# Patient Record
Sex: FEMALE | Race: WHITE | NOT HISPANIC OR LATINO | Employment: FULL TIME | URBAN - METROPOLITAN AREA
[De-identification: names, ages, dates, MRNs, and addresses within clinical notes are randomized per-mention and may not be internally consistent; named-entity substitution may affect disease eponyms.]

---

## 2017-01-06 ENCOUNTER — LAB VISIT (OUTPATIENT)
Dept: LAB | Facility: HOSPITAL | Age: 52
End: 2017-01-06
Attending: FAMILY MEDICINE
Payer: COMMERCIAL

## 2017-01-06 ENCOUNTER — OFFICE VISIT (OUTPATIENT)
Dept: FAMILY MEDICINE | Facility: CLINIC | Age: 52
End: 2017-01-06
Payer: COMMERCIAL

## 2017-01-06 VITALS
HEART RATE: 55 BPM | HEIGHT: 61 IN | WEIGHT: 163.13 LBS | DIASTOLIC BLOOD PRESSURE: 82 MMHG | TEMPERATURE: 98 F | BODY MASS INDEX: 30.8 KG/M2 | SYSTOLIC BLOOD PRESSURE: 120 MMHG | OXYGEN SATURATION: 98 %

## 2017-01-06 DIAGNOSIS — R63.5 ABNORMAL WEIGHT GAIN: ICD-10-CM

## 2017-01-06 DIAGNOSIS — Z12.31 VISIT FOR SCREENING MAMMOGRAM: ICD-10-CM

## 2017-01-06 DIAGNOSIS — Z11.59 NEED FOR HEPATITIS C SCREENING TEST: ICD-10-CM

## 2017-01-06 DIAGNOSIS — Z00.00 ANNUAL PHYSICAL EXAM: ICD-10-CM

## 2017-01-06 DIAGNOSIS — Z13.1 SCREENING FOR DIABETES MELLITUS: ICD-10-CM

## 2017-01-06 DIAGNOSIS — Z00.00 ANNUAL PHYSICAL EXAM: Primary | ICD-10-CM

## 2017-01-06 DIAGNOSIS — R79.89 INCREASED PTH LEVEL: ICD-10-CM

## 2017-01-06 LAB
ALBUMIN SERPL BCP-MCNC: 4.2 G/DL
ALP SERPL-CCNC: 64 U/L
ALT SERPL W/O P-5'-P-CCNC: 17 U/L
ANION GAP SERPL CALC-SCNC: 10 MMOL/L
AST SERPL-CCNC: 28 U/L
BASOPHILS # BLD AUTO: 0.04 K/UL
BASOPHILS NFR BLD: 0.5 %
BILIRUB SERPL-MCNC: 0.3 MG/DL
BILIRUB UR QL STRIP: NEGATIVE
BUN SERPL-MCNC: 19 MG/DL
CA-I BLDV-SCNC: 1.43 MMOL/L
CALCIUM SERPL-MCNC: 10.2 MG/DL
CHLORIDE SERPL-SCNC: 103 MMOL/L
CLARITY UR REFRACT.AUTO: CLEAR
CO2 SERPL-SCNC: 23 MMOL/L
COLOR UR AUTO: YELLOW
CREAT SERPL-MCNC: 0.9 MG/DL
DIFFERENTIAL METHOD: NORMAL
EOSINOPHIL # BLD AUTO: 0.4 K/UL
EOSINOPHIL NFR BLD: 4.7 %
ERYTHROCYTE [DISTWIDTH] IN BLOOD BY AUTOMATED COUNT: 13 %
EST. GFR  (AFRICAN AMERICAN): >60 ML/MIN/1.73 M^2
EST. GFR  (NON AFRICAN AMERICAN): >60 ML/MIN/1.73 M^2
GLUCOSE SERPL-MCNC: 88 MG/DL
GLUCOSE UR QL STRIP: NEGATIVE
HCT VFR BLD AUTO: 39.8 %
HGB BLD-MCNC: 13.7 G/DL
HGB UR QL STRIP: NEGATIVE
KETONES UR QL STRIP: ABNORMAL
LEUKOCYTE ESTERASE UR QL STRIP: NEGATIVE
LYMPHOCYTES # BLD AUTO: 2.3 K/UL
LYMPHOCYTES NFR BLD: 26.7 %
MCH RBC QN AUTO: 30.2 PG
MCHC RBC AUTO-ENTMCNC: 34.4 %
MCV RBC AUTO: 88 FL
MONOCYTES # BLD AUTO: 0.7 K/UL
MONOCYTES NFR BLD: 7.5 %
NEUTROPHILS # BLD AUTO: 5.3 K/UL
NEUTROPHILS NFR BLD: 60.4 %
NITRITE UR QL STRIP: NEGATIVE
PH UR STRIP: 6 [PH] (ref 5–8)
PLATELET # BLD AUTO: 348 K/UL
PMV BLD AUTO: 10 FL
POTASSIUM SERPL-SCNC: 4.2 MMOL/L
PROT SERPL-MCNC: 7.6 G/DL
PROT UR QL STRIP: NEGATIVE
PTH-INTACT SERPL-MCNC: 135 PG/ML
RBC # BLD AUTO: 4.53 M/UL
SODIUM SERPL-SCNC: 136 MMOL/L
SP GR UR STRIP: 1.01 (ref 1–1.03)
TSH SERPL DL<=0.005 MIU/L-ACNC: 1.51 UIU/ML
URN SPEC COLLECT METH UR: ABNORMAL
UROBILINOGEN UR STRIP-ACNC: NEGATIVE EU/DL
WBC # BLD AUTO: 8.77 K/UL

## 2017-01-06 PROCEDURE — 86803 HEPATITIS C AB TEST: CPT

## 2017-01-06 PROCEDURE — 99396 PREV VISIT EST AGE 40-64: CPT | Mod: S$GLB,,, | Performed by: FAMILY MEDICINE

## 2017-01-06 PROCEDURE — 99999 PR PBB SHADOW E&M-EST. PATIENT-LVL III: CPT | Mod: PBBFAC,,, | Performed by: FAMILY MEDICINE

## 2017-01-06 PROCEDURE — 85025 COMPLETE CBC W/AUTO DIFF WBC: CPT

## 2017-01-06 PROCEDURE — 83970 ASSAY OF PARATHORMONE: CPT

## 2017-01-06 PROCEDURE — 80053 COMPREHEN METABOLIC PANEL: CPT

## 2017-01-06 PROCEDURE — 36415 COLL VENOUS BLD VENIPUNCTURE: CPT | Mod: PO

## 2017-01-06 PROCEDURE — 84443 ASSAY THYROID STIM HORMONE: CPT

## 2017-01-06 PROCEDURE — 82330 ASSAY OF CALCIUM: CPT

## 2017-01-06 PROCEDURE — 83036 HEMOGLOBIN GLYCOSYLATED A1C: CPT

## 2017-01-06 PROCEDURE — 81003 URINALYSIS AUTO W/O SCOPE: CPT

## 2017-01-06 NOTE — PATIENT INSTRUCTIONS
Prevention Guidelines, Women Ages 50 to 64  Screening tests and vaccines are an important part of managing your health. Health counseling is essential, too. Below are guidelines for these, for women ages 50 to 64. Talk with your healthcare provider to make sure youre up to date on what you need.  Screening Who needs it How often   Type 2 diabetes or prediabetes All adults beginning at age 45 and adults without symptoms at any age who are overweight or obese and have 1 or more additional risk factors for diabetes. At  least every 3 years   Alcohol misuse All women in this age group At routine exams   Blood pressure All women in this age group Every 2 years if your blood pressure is less than 120/80 mm Hg; yearly if your systolic blood pressure is 120 to 139 mm Hg, or your diastolic blood pressure reading is 80 to 89 mm Hg   Breast cancer All women in this age group Yearly mammogram and clinical breast exam1   Cervical cancer All women in this age group, except women who have had a complete hysterectomy Pap test every 3 years or Pap test with human papillomavirus (HPV) test every 5 years   Chlamydia Women at increased risk for infection At routine exams   Colorectal cancer All women in this age group Flexible sigmoidoscopy every 5 years, or colonoscopy every 10 years, or double-contrast barium enema every 5 years; yearly fecal occult blood test or fecal immunochemical test; or a stool DNA test as often as your health care provider advises; talk with your health care provider about which tests are best for you   Depression All women in this age group At routine exams   Gonorrhea Sexually active women at increased risk for infection At routine exams   Hepatitis C Anyone at increased risk; 1 time for those born between 1945 and 1965 At routine exams   High cholesterol or triglycerides All women in this age group who are at risk for coronary artery disease At least every 5 years   HIV All women At routine exams   Lung  cancer Adults age 55 to 80 who have smoked Yearly screening in smokers with 30 pack-year history of smoking or who quit within 15 years   Obesity All women in this age group At routine exams   Osteoporosis Women who are postmenopausal Ask your healthcare provider   Syphilis Women at increased risk for infection - talk with your healthcare provider At routine exams   Tuberculosis Women at increased risk for infection - talk with your healthcare provider Ask your healthcare provider   Vision All women in this age group Ask your healthcare provider   Vaccine Who needs it How often   Chickenpox (varicella) All women in this age group who have no record of this infection or vaccine 2 doses; the second dose should be given at least 4 weeks after the first dose   Hepatitis A Women at increased risk for infection - talk with your healthcare provider 2 doses given at least 6 months apart   Hepatitis B Women at increased risk for infection - talk with your healthcare provider 3 doses over 6 months; second dose should be given 1 month after the first dose; the third dose should be given at least 2 months after the second dose and at least 4 months after the first dose   Haemophilus influenzaeType B (HIB) Women at increased risk for infection - talk with your healthcare provider 1 to 3 doses   Influenza (flu) All women in this age group Once a year   Measles, mumps, rubella (MMR) Women in this age group through their late 50s who have no record of these infections or vaccines 1 dose   Meningococcal Women at increased risk for infection - talk with your healthcare provider 1 or more doses   Pneumococcal conjugate vaccine (PCV13) and pneumococcal polysaccharide vaccine (PPSV23) Women at increased risk for infection - talk with your healthcare provider PCV13: 1 dose ages 19 to 65 (protects against 13 types of pneumococcal bacteria)  PPSV23: 1 to 2 doses through age 64, or 1 dose at 65 or older (protects against 23 types of  pneumococcal bacteria)   Tetanus/diphtheria/pertussis (Td/Tdap) booster All women in this age group Td every 10 years, or a one-time dose of Tdap instead of a Td booster after age 18, then Td every 10 years   Zoster All women ages 60 and older 1 dose   Counseling Who needs it How often   BRCA gene mutation testing for breast and ovarian cancer susceptibility Women with increased risk for having gene mutation When your risk is known   Breast cancer and chemoprevention Women at high risk for breast cancer When your risk is known   Diet and exercise Women who are overweight or obese When diagnosed, and then at routine exams   Sexually transmitted infection prevention Women at increased risk for infection - talk with your healthcare provider At routine exams   Use of daily aspirin Women ages 55 and up in this age group who are at risk for cardiovascular health problems such as stroke When your risk is known   Use of tobacco and the health effects it can cause All women in this age group Every exam   1American Cancer Society  © 1890-9550 [employer]. [address] [place] [address], [place] [address]. All rights reserved. This information is not intended as a substitute for professional medical care. Always follow your healthcare professional's instructions.

## 2017-01-06 NOTE — MR AVS SNAPSHOT
[facility]  [address]  [facility] [address]  Phone: [phone]  Fax: [phone]                  Kylah Ruiz   2017 11:20 AM   Office Visit    Description:  Female : 1965   Provider:  Meg Lemus MD   Department:  [facility]           Reason for Visit     Annual Exam           Diagnoses this Visit        Comments    Annual physical exam    -  Primary     Increased PTH level         Abnormal weight gain         Screening for diabetes mellitus         Need for hepatitis C screening test         Visit for screening mammogram                To Do List           Future Appointments        Provider Department Dept Phone    2017 8:00 AM [facility]ATORY, [facility]     1/10/2017 9:15 AM Xin Jones MD [facility]/ [phone]    1/10/2017 12:40 PM [facility] MAMMO1 [facility] [phone]      Goals (5 Years of Data)     None      [facility]     [facility] Nurse Care Line -  Assistance  Registered nurses in the [facility] provide clinical advisement, health education, appointment booking, and other advisory services.  Call for this free service at [phone].             Medications           Message regarding Medications     Verify the changes and/or additions to your medication regime listed below are the same as discussed with your clinician today.  If any of these changes or additions are incorrect, please notify your healthcare provider.             Verify that the below list of medications is an accurate representation of the medications you are currently taking.  If none reported, the list may be blank. If incorrect, please contact your healthcare provider. Carry this list with you in case of emergency.           Current Medications     cyclobenzaprine (FLEXERIL) 10 MG tablet     diazepam (VALIUM) 5 MG tablet     ondansetron (ZOFRAN-ODT) 4 MG TbDL   "   oxycodone-acetaminophen (PERCOCET) 7.5-325 mg per tablet            Clinical Reference Information           Vital Signs - Last Recorded  Most recent update: 1/6/2017 12:11 PM by Zaria Centeno LPN    BP Pulse Temp Ht Wt LMP    120/82 (!) 55 98.1 °F (36.7 °C) (Oral) 5' 1" (1.549 m) 74 kg (163 lb 2.3 oz) 01/05/2017    SpO2 BMI             98% 30.83 kg/m2         Blood Pressure          Most Recent Value    BP  120/82      Allergies as of 1/6/2017     Azithromycin      Immunizations Administered on Date of Encounter - 1/6/2017     None      Orders Placed During Today's Visit      Normal Orders This Visit    Urinalysis     Future Labs/Procedures Expected by Expires    Calcium, ionized  1/6/2017 3/7/2018    CBC auto differential  1/6/2017 3/7/2018    Comprehensive metabolic panel  1/6/2017 3/7/2018    Hemoglobin A1c  1/6/2017 3/7/2018    Hepatitis C antibody  1/6/2017 3/7/2018    Lipid panel  1/6/2017 3/7/2018    Mammo Digital Screening Bilat with CAD  1/6/2017 3/8/2018    PTH, intact  1/6/2017 3/7/2018    TSH  1/6/2017 3/7/2018      Instructions      Prevention Guidelines, Women Ages 50 to 64  Screening tests and vaccines are an important part of managing your health. Health counseling is essential, too. Below are guidelines for these, for women ages 50 to 64. Talk with your healthcare provider to make sure youre up to date on what you need.  Screening Who needs it How often   Type 2 diabetes or prediabetes All adults beginning at age 45 and adults without symptoms at any age who are overweight or obese and have 1 or more additional risk factors for diabetes. At  least every 3 years   Alcohol misuse All women in this age group At routine exams   Blood pressure All women in this age group Every 2 years if your blood pressure is less than 120/80 mm Hg; yearly if your systolic blood pressure is 120 to 139 mm Hg, or your diastolic blood pressure reading is 80 to 89 mm Hg   Breast cancer All women in this age group " Yearly mammogram and clinical breast exam1   Cervical cancer All women in this age group, except women who have had a complete hysterectomy Pap test every 3 years or Pap test with human papillomavirus (HPV) test every 5 years   Chlamydia Women at increased risk for infection At routine exams   Colorectal cancer All women in this age group Flexible sigmoidoscopy every 5 years, or colonoscopy every 10 years, or double-contrast barium enema every 5 years; yearly fecal occult blood test or fecal immunochemical test; or a stool DNA test as often as your health care provider advises; talk with your health care provider about which tests are best for you   Depression All women in this age group At routine exams   Gonorrhea Sexually active women at increased risk for infection At routine exams   Hepatitis C Anyone at increased risk; 1 time for those born between 1945 and 1965 At routine exams   High cholesterol or triglycerides All women in this age group who are at risk for coronary artery disease At least every 5 years   HIV All women At routine exams   Lung cancer Adults age 55 to 80 who have smoked Yearly screening in smokers with 30 pack-year history of smoking or who quit within 15 years   Obesity All women in this age group At routine exams   Osteoporosis Women who are postmenopausal Ask your healthcare provider   Syphilis Women at increased risk for infection - talk with your healthcare provider At routine exams   Tuberculosis Women at increased risk for infection - talk with your healthcare provider Ask your healthcare provider   Vision All women in this age group Ask your healthcare provider   Vaccine Who needs it How often   Chickenpox (varicella) All women in this age group who have no record of this infection or vaccine 2 doses; the second dose should be given at least 4 weeks after the first dose   Hepatitis A Women at increased risk for infection - talk with your healthcare provider 2 doses given at least 6  months apart   Hepatitis B Women at increased risk for infection - talk with your healthcare provider 3 doses over 6 months; second dose should be given 1 month after the first dose; the third dose should be given at least 2 months after the second dose and at least 4 months after the first dose   Haemophilus influenzaeType B (HIB) Women at increased risk for infection - talk with your healthcare provider 1 to 3 doses   Influenza (flu) All women in this age group Once a year   Measles, mumps, rubella (MMR) Women in this age group through their late 50s who have no record of these infections or vaccines 1 dose   Meningococcal Women at increased risk for infection - talk with your healthcare provider 1 or more doses   Pneumococcal conjugate vaccine (PCV13) and pneumococcal polysaccharide vaccine (PPSV23) Women at increased risk for infection - talk with your healthcare provider PCV13: 1 dose ages 19 to 65 (protects against 13 types of pneumococcal bacteria)  PPSV23: 1 to 2 doses through age 64, or 1 dose at 65 or older (protects against 23 types of pneumococcal bacteria)   Tetanus/diphtheria/pertussis (Td/Tdap) booster All women in this age group Td every 10 years, or a one-time dose of Tdap instead of a Td booster after age 18, then Td every 10 years   Zoster All women ages 60 and older 1 dose   Counseling Who needs it How often   BRCA gene mutation testing for breast and ovarian cancer susceptibility Women with increased risk for having gene mutation When your risk is known   Breast cancer and chemoprevention Women at high risk for breast cancer When your risk is known   Diet and exercise Women who are overweight or obese When diagnosed, and then at routine exams   Sexually transmitted infection prevention Women at increased risk for infection - talk with your healthcare provider At routine exams   Use of daily aspirin Women ages 55 and up in this age group who are at risk for cardiovascular health problems such as  stroke When your risk is known   Use of tobacco and the health effects it can cause All women in this age group Every exam   1American Cancer Society  © 7223-5592 [employer]. [address] [place] [address], [place] [address]. All rights reserved. This information is not intended as a substitute for professional medical care. Always follow your healthcare professional's instructions.

## 2017-01-07 NOTE — PROGRESS NOTES
Kylah Ruiz 51 y.o. White female      HPI- The patient is presenting today for Annual Exam  50yo female presents today for wellness examination. No hearing or vision concerns are reported. Diet is described as well balanced and healthy. She exercises regularly including weight lifting and cardio for 20 minutes at least three times weekly. Sleep and elimination patterns are described within normal limits. No report of depression or anxiety. She is concerned about significant weight gain within the past year in spite of healthy lifestyle. She is requesting further lab assessment to include screening for underlying thyroid disease. There is no prior history of thyroid dysfunction. Patient has gastric sleeve placement remotely. Updated health maintenance is needed. Immunizations are not up to date. There have been no recent ER visits or hospitalizations.    History reviewed. No pertinent past medical history.  Past Surgical History   Procedure Laterality Date    Gastric sleeve       section      Femur fracture with repair      Cosmetic surgery       tummy tuck breast enlargement     Family History   Problem Relation Age of Onset    Heart disease Father      Current Outpatient Prescriptions   Medication Sig Dispense Refill    cyclobenzaprine (FLEXERIL) 10 MG tablet       diazepam (VALIUM) 5 MG tablet       ondansetron (ZOFRAN-ODT) 4 MG TbDL       oxycodone-acetaminophen (PERCOCET) 7.5-325 mg per tablet        No current facility-administered medications for this visit.      Allergies-  Review of patient's allergies indicates:   Allergen Reactions    Azithromycin      ROS-   CONSTITUTIONAL- No fever, chills, fatigue or weight loss, +weight gain   HEENT- No vision changes, ear pain, hearing loss  CHEST- No cough, shortness of breath  CV- No chest pain, palpitations, edema  Abdomen- No abdominal pain, change in bowel habits, blood in stool  - No change in urination, no dysuria, no  "hematuria  Neurologic- No headaches, speech changes, vertigo, gait changes  Musculoskeletal- No joint pain, no back pain, no myalgias  Endocrine- No polydypsia, polyphagia or polyuria, no heat or cold intolerance  Hematologic- No bruising or bleeding, no lymphadenopathy  Psych- No change in mood, no concentration issues, no trouble with sleep  Integument- No rashes, no skin changes    Visit Vitals    /82    Pulse (!) 55    Temp 98.1 °F (36.7 °C) (Oral)    Ht 5' 1" (1.549 m)    Wt 74 kg (163 lb 2.3 oz)    LMP 01/05/2017    SpO2 98%    BMI 30.83 kg/m2     PE-  CONSTITIONAL- Well developed, well nourished, no acute distress  HEENT- Normal cephalic, atraumatic, PERRLA, right ear external- no abnormality, left ear external- no abnormality, right TM- normal to visualization, left TM- normal to visualization, moist mucus membranes, no oropharyngeal abnormality visualized nasal turbinates are clear  Neck- Full range of motion, no thyromegaly, no cervical lymphadenopathy  CV- Normal rate and rhythm, heart sounds normal, no murmurs, rubs or gallops  Chest- Breath sounds are normal and equal bilaterally, normal inspiratory and expiratory efforts  Abdomen- Bowel sounds are equal in all four quadrants, non tender to palpation, soft, no distention  Musculoskeletal- Normal ROM of the extremities, no tenderness  Neurologic- Alert, orientated x 3, cranial nerves intact  Skin- Warm and dry to touch, no rashes, no visible lesions  Psych- Mood and affect normal, Behavior normal    Assessment and Plan-  1. Annual physical exam  General health screening recommendations were reviewed with the patient in office today. Emphasized healthy eating and regular physical activity. Anticipatory guidance has been provided with regard to age and informational handouts have been given. Patient has declined screening C scope-this was discussed at length and she is aware of the risk of undiagnosed/untreated colon cancer and will report " back if she elects to proceed. Arrangements for GYN exam have been made. MMG is scheduled. Immunization update was discussed but she has declined. Next well check is advised in 1 year, rtc sooner if needed.  - CBC auto differential; Future  - Comprehensive metabolic panel; Future  - Lipid panel; Future  - Urinalysis    2. Increased PTH level  Will assess labs given previous abnormal findings.   - PTH, intact; Future  - Calcium, ionized; Future  - TSH; Future    3. Abnormal weight gain  Significant weight gain within the past 11 months in spite of reported diet and routine exercise. Will assess further lab assessment. Encouraged continued efforts at diet and exercise in the interim. Additional recommendations pending lab findings.    4. Screening for diabetes mellitus  - Hemoglobin A1c; Future    5. Need for hepatitis C screening test  - Hepatitis C antibody; Future    6. Visit for screening mammogram  - Mammo Digital Screening Bilat with CAD; Future

## 2017-01-08 LAB
ESTIMATED AVG GLUCOSE: 100 MG/DL
HBA1C MFR BLD HPLC: 5.1 %

## 2017-01-09 DIAGNOSIS — D35.1 PARATHYROID ADENOMA: ICD-10-CM

## 2017-01-09 DIAGNOSIS — R79.89 INCREASED PTH LEVEL: Primary | ICD-10-CM

## 2017-01-09 LAB — HCV AB SERPL QL IA: NEGATIVE

## 2017-01-10 ENCOUNTER — OFFICE VISIT (OUTPATIENT)
Dept: OBSTETRICS AND GYNECOLOGY | Facility: CLINIC | Age: 52
End: 2017-01-10
Payer: COMMERCIAL

## 2017-01-10 VITALS
HEIGHT: 61 IN | BODY MASS INDEX: 31.01 KG/M2 | SYSTOLIC BLOOD PRESSURE: 112 MMHG | WEIGHT: 164.25 LBS | DIASTOLIC BLOOD PRESSURE: 60 MMHG

## 2017-01-10 DIAGNOSIS — Z01.419 ENCOUNTER FOR GYNECOLOGICAL EXAMINATION WITHOUT ABNORMAL FINDING: Primary | ICD-10-CM

## 2017-01-10 PROCEDURE — 99999 PR PBB SHADOW E&M-EST. PATIENT-LVL III: CPT | Mod: PBBFAC,,, | Performed by: OBSTETRICS & GYNECOLOGY

## 2017-01-10 PROCEDURE — 99386 PREV VISIT NEW AGE 40-64: CPT | Mod: S$GLB,,, | Performed by: OBSTETRICS & GYNECOLOGY

## 2017-01-10 PROCEDURE — 88175 CYTOPATH C/V AUTO FLUID REDO: CPT

## 2017-01-10 NOTE — LETTER
January 10, 2017      Meg Lemus MD  [address] [address] [address]           [facility]  [address] [facility] [address]  Phone: [phone]  Fax: [phone]          Patient: Kylah Ruiz   MR Number: [id number]   YOB: 1965   Date of Visit: 1/10/2017       Dear Dr. Meg Lemus:    Thank you for referring Kylah Ruzi to me for evaluation. Attached you will find relevant portions of my assessment and plan of care.    If you have questions, please do not hesitate to call me. I look forward to following Kylah Ruiz along with you.    Sincerely,    Xin Jones MD    Enclosure  CC:  No Recipients    If you would like to receive this communication electronically, please contact [email or web address] or [phone] to request more information on [employer] access.    For providers and/or their staff who would like to refer a patient to [facility], please contact us through our one-stop-shop provider referral line, [facility], at [phone].    If you feel you have received this communication in error or would no longer like to receive these types of communications, please e-mail [email or web address]

## 2017-01-10 NOTE — PROGRESS NOTES
"CC: Well woman exam    Kylah Ruiz is a 51 y.o. female  presents for a well woman exam.  LMP: Patient's last menstrual period was 2017..  No issues, problems, or complaints.    History reviewed. No pertinent past medical history.  Past Surgical History   Procedure Laterality Date    Gastric sleeve      Femur fracture with repair      Cosmetic surgery       tummy tuck breast enlargement     section     Breast augmentation    Social History     Social History    Marital status:      Spouse name: N/A    Number of children: N/A    Years of education: N/A     Occupational History    accounting /auto repair      Social History Main Topics    Smoking status: Former Smoker     Quit date: 2009    Smokeless tobacco: Never Used    Alcohol use No    Drug use: No    Sexual activity: Yes     Partners: Male     Other Topics Concern    None     Social History Narrative     Family History   Problem Relation Age of Onset    Heart disease Father      OB History      Para Term  AB TAB SAB Ectopic Multiple Living    1 1 1       1        No current outpatient prescriptions on file.    GYNECOLOGY HISTORY:  No abnormal pap/std    DATA REVIEWED:  Last pap: normal Date: "few years ago"  Last mmg: normal Date: <1y ago  Last colonoscopy:     Visit Vitals    /60    Ht 5' 1" (1.549 m)    Wt 74.5 kg (164 lb 3.9 oz)    LMP 2017    BMI 31.03 kg/m2       ROS:  GENERAL: Denies weight gain or weight loss. Feeling well overall.   SKIN: Denies rash or lesions.   HEAD: Denies head injury or headache.   NODES: Denies enlarged lymph nodes.   CHEST: Denies chest pain or shortness of breath.   CARDIOVASCULAR: Denies palpitations or left sided chest pain.   ABDOMEN: No abdominal pain, constipation, diarrhea, nausea, vomiting or rectal bleeding.   URINARY: No frequency, dysuria, hematuria, or burning on urination.  REPRODUCTIVE: See HPI.   BREASTS: The patient denies pain, lumps, " or nipple discharge.   HEMATOLOGIC: No easy bruisability or excessive bleeding.   MUSCULOSKELETAL: Denies joint pain or swelling.   NEUROLOGIC: Denies syncope or weakness.   PSYCHIATRIC: Denies depression, anxiety or mood swings.    PHYSICAL EXAM:    APPEARANCE: Well nourished, well developed, in no acute distress.  AFFECT: WNL, alert and oriented x 3  SKIN: No acne or hirsutism  NECK: Neck symmetric without masses or thyromegaly  NODES: No inguinal, cervical, axillary, or femoral lymph node enlargement  CHEST: Good respiratory effect  ABDOMEN: Soft.  No tenderness or masses.  No hepatosplenomegaly.  No hernias.  BREASTS: Symmetrical, no skin changes or visible lesions.  No palpable masses, nipple discharge bilaterally.  PELVIC: Normal external genitalia without lesions.  Normal hair distribution.  Adequate perineal body, normal urethral meatus.  Vagina atrophic without lesions or discharge.  Cervix pink, without lesions, discharge or tenderness.  No significant cystocele or rectocele.  Bimanual exam shows uterus to be normal size, regular, mobile and nontender, anteflexed.  Adnexa without masses or tenderness.   EXTREMITIES: No edema.    Encounter for gynecological examination without abnormal finding  -     Liquid-based pap smear, screening    Patient was counseled today on A.C.S. Pap guidelines (q3) and recommendations for yearly pelvic exams, yearly mammograms starting age 40, and clinical breast exams; to see her PCP for other health maintenance.

## 2017-01-10 NOTE — MR AVS SNAPSHOT
[facility]  [address] [facility]  [place] [address]  Phone: [phone]  Fax: [phone]                  Kylah Ruiz   1/10/2017 9:15 AM   Office Visit    Description:  Female : 1965   Provider:  Xin Jones MD   Department:  [facility]           Reason for Visit     Establish Care           Diagnoses this Visit        Comments    Encounter for gynecological examination without abnormal finding    -  Primary            To Do List           Future Appointments        Provider Department Dept Phone    4/3/2017 9:40 AM ON [facility] [phone]      Goals (5 Years of Data)     None      Follow-Up and Disposition     Return in about 1 year (around 1/10/2018).      [facility]     [facility] -  Assistance  Registered nurses in the [facility] provide clinical advisement, health education, appointment booking, and other advisory services.  Call for this free service at [phone].             Medications           Message regarding Medications     Verify the changes and/or additions to your medication regime listed below are the same as discussed with your clinician today.  If any of these changes or additions are incorrect, please notify your healthcare provider.        STOP taking these medications     cyclobenzaprine (FLEXERIL) 10 MG tablet     diazepam (VALIUM) 5 MG tablet     ondansetron (ZOFRAN-ODT) 4 MG TbDL     oxycodone-acetaminophen (PERCOCET) 7.5-325 mg per tablet            Verify that the below list of medications is an accurate representation of the medications you are currently taking.  If none reported, the list may be blank. If incorrect, please contact your healthcare provider. Carry this list with you in case of emergency.           Current Medications            Clinical Reference Information           Vital Signs - Last Recorded  Most recent update: 1/10/2017  9:27 AM by Rudolph  "Alatorre, LPN    BP Ht Wt LMP BMI    112/60 5' 1" (1.549 m) 74.5 kg (164 lb 3.9 oz) 01/05/2017 31.03 kg/m2      Blood Pressure          Most Recent Value    BP  112/60      Allergies as of 1/10/2017     Azithromycin      Immunizations Administered on Date of Encounter - 1/10/2017     None      Orders Placed During Today's Visit      Normal Orders This Visit    Liquid-based pap smear, screening       "

## 2017-01-11 ENCOUNTER — TELEPHONE (OUTPATIENT)
Dept: FAMILY MEDICINE | Facility: CLINIC | Age: 52
End: 2017-01-11

## 2017-01-11 NOTE — TELEPHONE ENCOUNTER
----- Message from Loli Knight sent at 1/11/2017 10:34 AM CST -----  Contact: pt  Pt is calling to follow up with dr from appt date 1/6/17. Pls call pt back at [phone]

## 2017-01-12 ENCOUNTER — HOSPITAL ENCOUNTER (OUTPATIENT)
Dept: RADIOLOGY | Facility: HOSPITAL | Age: 52
Discharge: HOME | End: 2017-01-12
Attending: FAMILY MEDICINE
Payer: COMMERCIAL

## 2017-01-12 DIAGNOSIS — R79.89 INCREASED PTH LEVEL: ICD-10-CM

## 2017-01-12 PROCEDURE — 76536 US EXAM OF HEAD AND NECK: CPT | Mod: TC

## 2017-01-13 ENCOUNTER — TELEPHONE (OUTPATIENT)
Dept: FAMILY MEDICINE | Facility: CLINIC | Age: 52
End: 2017-01-13

## 2017-01-13 ENCOUNTER — PATIENT MESSAGE (OUTPATIENT)
Dept: FAMILY MEDICINE | Facility: CLINIC | Age: 52
End: 2017-01-13

## 2017-01-13 NOTE — TELEPHONE ENCOUNTER
Spoke with Dr. Tamez nurse she stated that yes Dr. Arevalo will see pt for the appt.   appt scheduled for 1/24/17 at 2:30 pm at [facility] 2nd floor.

## 2017-01-13 NOTE — TELEPHONE ENCOUNTER
Results were discussed with the patient by phone. Questions were encouraged and answered. Patient will keep scheduled appointment with ENT next week as planned.

## 2017-01-13 NOTE — TELEPHONE ENCOUNTER
----- Message from Christo Cummings sent at 1/13/2017  2:03 PM CST -----  Contact: Pt   Pt is returning a missed call.  Please advise [phone]

## 2017-01-16 ENCOUNTER — PATIENT MESSAGE (OUTPATIENT)
Dept: OBSTETRICS AND GYNECOLOGY | Facility: HOSPITAL | Age: 52
End: 2017-01-16

## 2017-01-24 ENCOUNTER — OFFICE VISIT (OUTPATIENT)
Dept: OTOLARYNGOLOGY | Facility: CLINIC | Age: 52
End: 2017-01-24
Payer: COMMERCIAL

## 2017-01-24 VITALS
BODY MASS INDEX: 30.95 KG/M2 | SYSTOLIC BLOOD PRESSURE: 124 MMHG | HEART RATE: 59 BPM | DIASTOLIC BLOOD PRESSURE: 69 MMHG | WEIGHT: 163.81 LBS

## 2017-01-24 DIAGNOSIS — E21.0 PRIMARY HYPERPARATHYROIDISM: Primary | ICD-10-CM

## 2017-01-24 DIAGNOSIS — E83.52 HYPERCALCEMIA: ICD-10-CM

## 2017-01-24 PROCEDURE — 99999 PR PBB SHADOW E&M-EST. PATIENT-LVL III: CPT | Mod: PBBFAC,,, | Performed by: OTOLARYNGOLOGY

## 2017-01-24 PROCEDURE — 99244 OFF/OP CNSLTJ NEW/EST MOD 40: CPT | Mod: S$GLB,,, | Performed by: OTOLARYNGOLOGY

## 2017-01-24 NOTE — MR AVS SNAPSHOT
[facility]  [address] [facility]  [place] [address]  Phone: [phone]  Fax: [phone]                  Kylah Ruiz   2017 2:30 PM   Office Visit    Description:  Female : 1965   Provider:  Feroz Arevalo MD   Department:  [facility]           Reason for Visit     Consult           Diagnoses this Visit        Comments    Primary hyperparathyroidism    -  Primary            To Do List           Future Appointments        Provider Department Dept Phone    2017 9:00 AM [facility] -  [phone]    2017 11:00 AM [facility] - [phone]    2017 12:30 PM Feroz Arevalo MD [facility] [phone]    4/3/2017 9:40 AM [facility] MAMMO1 [facility] [phone]      Goals (5 Years of Data)     None      [facility]     [facility] Care [facility] -  Assistance  Registered nurses in the [facility] provide clinical advisement, health education, appointment booking, and other advisory services.  Call for this free service at [phone].             Medications           Message regarding Medications     Verify the changes and/or additions to your medication regime listed below are the same as discussed with your clinician today.  If any of these changes or additions are incorrect, please notify your healthcare provider.             Verify that the below list of medications is an accurate representation of the medications you are currently taking.  If none reported, the list may be blank. If incorrect, please contact your healthcare provider. Carry this list with you in case of emergency.                Clinical Reference Information           Vital Signs - Last Recorded  Most recent update: 2017  2:59 PM by Linda Hawley LPN    BP Pulse Wt LMP BMI    124/69 (!) 59 74.3 kg (163 lb 12.8 oz) 2017 30.95 kg/m2      Blood Pressure           Most Recent Value    BP  124/69      Allergies as of 1/24/2017     Azithromycin      Immunizations Administered on Date of Encounter - 1/24/2017     None      Orders Placed During Today's Visit     Future Labs/Procedures Expected by Expires    NM Parathyroid Scan Planar  1/24/2017 1/24/2018

## 2017-01-24 NOTE — LETTER
January 25, 2017      Meg Lemus MD  [address] [address] [address]           [facility]  [address] [place] [address]  Phone: [phone]  Fax: [phone]          Patient: Kylah Ruiz   MR Number: [id number]   YOB: 1965   Date of Visit: 1/24/2017       Dear Dr. Meg Lemus:    Thank you for referring Kylah Ruiz to me for evaluation. Attached you will find relevant portions of my assessment and plan of care.    If you have questions, please do not hesitate to call me. I look forward to following Kylah Ruiz along with you.    Sincerely,    Feroz Arevalo MD    Enclosure  CC:  No Recipients    If you would like to receive this communication electronically, please contact [email or web address] or [phone] to request more information on [facility] [employer] access.    For providers and/or their staff who would like to refer a patient to [facility], please contact us through our one-stop-shop provider referral line, [facility], at [phone].    If you feel you have received this communication in error or would no longer like to receive these types of communications, please e-mail [email or web address]

## 2017-01-25 NOTE — PROGRESS NOTES
Referring Provider:    Meg Cuello Md  [address] [place] [address]  Subjective:   Patient: Kylah Ruiz [id number], :1965   Visit date:2017 12:56 PM    Chief Complaint:  Consult (parathyroid adenoma)    HPI:  Kylah is a 51 y.o. female who I was asked to see in consultation for evaluation of the following issue(s):    Fragile bones that easily fracture (osteoporosis)- unknown, of note she does quite a lot of heavy weight lifting  Kidney stones- no  Excessive urination- YES  Abdominal pain-no  Tiring easily or weakness- no  Depression or forgetfulness-  no  Bone and joint pain- no  Frequent complaints of illness with no apparent cause- no  Nausea, vomiting or loss of appetite- no    Notes Recorded by Meg Cuello MD on 2017 at 1:11 PM  Results were discussed with the patient by phone. Have advised thyroid/parathyroid ultrasound based on abnormal labs. She is agreeable. Please schedule.         Ref Range & Units 2wk ago   11mo ago      PTH, Intact 9.0 - 77.0 pg/mL 135.0 (H) 108.0 (H)   Resulting Agency  [facility]      Specimen Collected: 17  1:55 PM Last Resulted: 17 10:17 PM                 Ref Range & Units 2wk ago     Calcium, Ion 1.06 - 1.42 mmol/L 1.43 (H)   Resulting Agency  [facility]      Specimen Collected: 17             Ref Range & Units 11mo ago     Vit D, 25-Hydroxy 30 - 96 ng/mL 67        Ref Range & Units 11mo ago     Sodium 136 - 145 mmol/L 136   Potassium 3.5 - 5.1 mmol/L 4.9   Chloride 95 - 110 mmol/L 105   CO2 23 - 29 mmol/L 24   Glucose 70 - 110 mg/dL 96   BUN, Bld 6 - 20 mg/dL 23 (H)   Creatinine 0.5 - 1.4 mg/dL 0.9   Calcium 8.7 - 10.5 mg/dL 10.9 (H)   Anion Gap 8 - 16 mmol/L 7 (L)   eGFR if African American >60 mL/min/1.73 m^2 >60   eGFR if non African American >60 mL/min/1.73 m^2 >60   Comments: Calculation used to obtain the estimated glomerular filtration   rate (eGFR) is the CKD-EPI equation. Since race is unknown   in our  information system, the eGFR values for   -American and Non--American patients are given   for each creatinine result.      Resulting Agency  [facility]      Specimen Collected: 16  8:26           Review of Systems:  Negative unless checked off.  Gen:  []fever   []fatigue  HENT:  []nosebleeds  []dental problem   Eyes:  []photophobia  []visual disturbance  Resp:  []chest tightness []wheezing  Card:  []chest pain  []leg swelling  GI:  []abdominal pain []blood in stool  :  []dysuria  []hematuria  Musc:  []joint swelling  []gait problem  Skin:  []color change  []pallor  Neuro:  []seizures  []numbness  Hem:  []bruise/bleed easily  Psych:  []hallucinations  []behavioral problems  Allergy/Imm: is allergic to azithromycin.    Her meds, allergies, medical, surgical, social & family histories were reviewed & updated:  -     She currently has no medications in their medication list.  -     She  has no past medical history on file.   -     She  does not have a problem list on file.   -     She  has a past surgical history that includes gastric sleeve; Femur fracture with repair; Cosmetic surgery; and  section.  -     She  reports that she quit smoking about 7 years ago. She has never used smokeless tobacco. She reports that she does not drink alcohol or use illicit drugs.  -     Her family history includes Heart disease in her father.  -     She is allergic to azithromycin.    Objective:     Physical Exam:  Vitals:    Visit Vitals    /69    Pulse (!) 59    Wt 74.3 kg (163 lb 12.8 oz)    LMP 2017    BMI 30.95 kg/m2     General appearance:  Well developed, well nourished    Eyes:  Extraocular motions intact, PERRL    Communication:  no hoarseness, no dysphonia    Ears:  Otoscopy of external auditory canals and tympanic membranes was normal, clinical speech reception thresholds grossly intact, no mass/lesion of auricle.  Nose:  No masses/lesions of external nose, nasal mucosa, septum, and  turbinates were within normal limits.  Mouth:  No mass/lesion of lips, teeth, gums, hard/soft palate, tongue, tonsils, or oropharynx.    Cardiovascular:  No pedal edema; Radial Pulses +2     Neck & Lymphatics:  No cervical lymphadenopathy, no neck mass/crepitus/ asymmetry, trachea is midline, no thyroid enlargement/tenderness/mass.    Psych: Oriented x3,  Alert with normal mood and affect.     Respiration/Chest:  Symmetric expansion during respiration, normal respiratory effort.    Skin:  Warm and intact. No ulcerations of face, scalp, neck.    Thyroid ultrasound    Indication: Elevated parathyroid hormone levels.    Findings: The right lobe of the thyroid gland measures 4.7 x 1.8 x 1.5 cm.  The isthmus measures 0.2 cm in thickness.  The left lobe measures 5.2 x 1.4 x 1.7 cm.  No thyroid nodules are seen.    Inferior to the lower pole of the right lobe there is a hypoechoic somewhat heterogeneous nodule measuring 1.3 x 0.7 x 0.7 cm and shows marked increased vascularity with color Doppler interrogation.   Impression    Mass lesion inferior to the lower pole of the right lobe of the thyroid gland measuring 1.3 x 0.7 x 0.7 cm with imaging characteristics favoring a parathyroid adenoma.      Electronically signed by: ANEESH TEJADA MD  Date: 01/12/17       Assessment & Plan:   Kylah was seen today for consult.    Diagnoses and all orders for this visit:    Primary hyperparathyroidism  -     NM Parathyroid Scan Planar; Future      Kylah presents today with a problem of moderate to high complexity.  I had a lengthy discussion with the patient today regarding the mechanisms of hyperparathyroidism and is this leads to hypercalcemia.  In the patient without kidney disease, primary hyperparathyroidism is the most likely cause of the symptoms with a single adenoma occurring about 80% of the time.  2 adenomas can occur up to about 10% of time and a very small population present with either primary 4 gland hyperplasia or  malignancy.  Malignancy is typically characterized by extremely elevated parathyroid hormone levels.  Her ultrasound also suggestive of a parathyroid adenoma.  I would like to obtain a sestamibi scan which can assist in confirming that this is indeed a parathyroid adenoma.  We discussed that this is not a condition that can generally be cured without surgery.  Right now she has fairly mild symptoms with her most bothersome symptom being frequent urination.  Her bone density status is unknown but I suspect that she has some decent bone calcium retention due to very heavy weight lifting.  We discussed the nature of the surgery and the details regarding it including the risks which this entails.  She seems fairly reluctant to pursue surgical treatment at this time.  I advised her that we could discuss this further and the likely extent of surgery can be more easily predicted if the ultrasound and sestamibi scan both suggest a parathyroid adenoma in the same location.  She has the sestamibi scan scheduled for the near future and she will follow up after this.    Thank you for allowing me to participate in the care of Kylah.    Feroz Arevalo MD

## 2017-01-31 ENCOUNTER — HOSPITAL ENCOUNTER (OUTPATIENT)
Dept: RADIOLOGY | Facility: HOSPITAL | Age: 52
Discharge: HOME | End: 2017-01-31
Attending: OTOLARYNGOLOGY
Payer: COMMERCIAL

## 2017-01-31 DIAGNOSIS — E21.0 PRIMARY HYPERPARATHYROIDISM: ICD-10-CM

## 2017-01-31 PROCEDURE — 78070 PARATHYROID PLANAR IMAGING: CPT | Mod: TC

## 2017-02-01 ENCOUNTER — OFFICE VISIT (OUTPATIENT)
Dept: OTOLARYNGOLOGY | Facility: CLINIC | Age: 52
End: 2017-02-01
Payer: COMMERCIAL

## 2017-02-01 VITALS
WEIGHT: 163.38 LBS | BODY MASS INDEX: 30.87 KG/M2 | SYSTOLIC BLOOD PRESSURE: 118 MMHG | DIASTOLIC BLOOD PRESSURE: 76 MMHG | TEMPERATURE: 99 F | HEART RATE: 66 BPM

## 2017-02-01 DIAGNOSIS — D35.1 PARATHYROID ADENOMA: ICD-10-CM

## 2017-02-01 DIAGNOSIS — E21.3 HYPERPARATHYROIDISM: Primary | ICD-10-CM

## 2017-02-01 PROCEDURE — 99999 PR PBB SHADOW E&M-EST. PATIENT-LVL III: CPT | Mod: PBBFAC,,, | Performed by: OTOLARYNGOLOGY

## 2017-02-01 PROCEDURE — 99214 OFFICE O/P EST MOD 30 MIN: CPT | Mod: S$GLB,,, | Performed by: OTOLARYNGOLOGY

## 2017-02-01 NOTE — PROGRESS NOTES
Referring Provider:    No referring provider defined for this encounter.  Subjective:   Patient: Kylah Ruiz [id number], :1965   Visit date:2017 12:56 PM    Chief Complaint:  Follow-up (review results of parathyroid scan )    HPI:  Kylah is a 51 y.o. female who I was asked to see in consultation for evaluation of the following issue(s):    Fragile bones that easily fracture (osteoporosis)- unknown, of note she does quite a lot of heavy weight lifting  Kidney stones- no  Excessive urination- YES  Abdominal pain-no  Tiring easily or weakness- no  Depression or forgetfulness-  no  Bone and joint pain- no  Frequent complaints of illness with no apparent cause- no  Nausea, vomiting or loss of appetite- no    Notes Recorded by Meg Lemus MD on 2017 at 1:11 PM  Results were discussed with the patient by phone. Have advised thyroid/parathyroid ultrasound based on abnormal labs. She is agreeable. Please schedule.         Ref Range & Units 2wk ago   11mo ago      PTH, Intact 9.0 - 77.0 pg/mL 135.0 (H) 108.0 (H)   Resulting Agency  [facility]      Specimen Collected: 17  1:55 PM Last Resulted: 17 10:17 PM                 Ref Range & Units 2wk ago     Calcium, Ion 1.06 - 1.42 mmol/L 1.43 (H)   Resulting Agency  [facility]      Specimen Collected: 17             Ref Range & Units 11mo ago     Vit D, 25-Hydroxy 30 - 96 ng/mL 67        Ref Range & Units 11mo ago     Sodium 136 - 145 mmol/L 136   Potassium 3.5 - 5.1 mmol/L 4.9   Chloride 95 - 110 mmol/L 105   CO2 23 - 29 mmol/L 24   Glucose 70 - 110 mg/dL 96   BUN, Bld 6 - 20 mg/dL 23 (H)   Creatinine 0.5 - 1.4 mg/dL 0.9   Calcium 8.7 - 10.5 mg/dL 10.9 (H)   Anion Gap 8 - 16 mmol/L 7 (L)   eGFR if African American >60 mL/min/1.73 m^2 >60   eGFR if non African American >60 mL/min/1.73 m^2 >60   Comments: Calculation used to obtain the estimated glomerular filtration   rate (eGFR) is the CKD-EPI equation. Since race is unknown   in our  information system, the eGFR values for   -American and Non--American patients are given   for each creatinine result.      Resulting Agency  [facility]      Specimen Collected: 16  8:26           Review of Systems:  Negative unless checked off.  Gen:  []fever   []fatigue  HENT:  []nosebleeds  []dental problem   Eyes:  []photophobia  []visual disturbance  Resp:  []chest tightness []wheezing  Card:  []chest pain  []leg swelling  GI:  []abdominal pain []blood in stool  :  []dysuria  []hematuria  Musc:  []joint swelling  []gait problem  Skin:  []color change  []pallor  Neuro:  []seizures  []numbness  Hem:  []bruise/bleed easily  Psych:  []hallucinations  []behavioral problems  Allergy/Imm: is allergic to azithromycin.    Her meds, allergies, medical, surgical, social & family histories were reviewed & updated:  -     She currently has no medications in their medication list.  -     She  has no past medical history on file.   -     She  does not have a problem list on file.   -     She  has a past surgical history that includes gastric sleeve; Femur fracture with repair; Cosmetic surgery; and  section.  -     She  reports that she quit smoking about 7 years ago. She has never used smokeless tobacco. She reports that she does not drink alcohol or use illicit drugs.  -     Her family history includes Heart disease in her father.  -     She is allergic to azithromycin.    Objective:     Physical Exam:  Vitals:    Visit Vitals    /76    Pulse 66    Temp 99.1 °F (37.3 °C) (Tympanic)    Wt 74.1 kg (163 lb 5.8 oz)    LMP 2017    BMI 30.87 kg/m2     General appearance:  Well developed, well nourished    Eyes:  Extraocular motions intact, PERRL    Communication:  no hoarseness, no dysphonia    Ears:  Otoscopy of external auditory canals and tympanic membranes was normal, clinical speech reception thresholds grossly intact, no mass/lesion of auricle.  Nose:  No masses/lesions of external  nose, nasal mucosa, septum, and turbinates were within normal limits.  Mouth:  No mass/lesion of lips, teeth, gums, hard/soft palate, tongue, tonsils, or oropharynx.    Cardiovascular:  No pedal edema; Radial Pulses +2     Neck & Lymphatics:  No cervical lymphadenopathy, no neck mass/crepitus/ asymmetry, trachea is midline, no thyroid enlargement/tenderness/mass.    Psych: Oriented x3,  Alert with normal mood and affect.     Respiration/Chest:  Symmetric expansion during respiration, normal respiratory effort.    Skin:  Warm and intact. No ulcerations of face, scalp, neck.    Thyroid ultrasound    Indication: Elevated parathyroid hormone levels.    Findings: The right lobe of the thyroid gland measures 4.7 x 1.8 x 1.5 cm.  The isthmus measures 0.2 cm in thickness.  The left lobe measures 5.2 x 1.4 x 1.7 cm.  No thyroid nodules are seen.    Inferior to the lower pole of the right lobe there is a hypoechoic somewhat heterogeneous nodule measuring 1.3 x 0.7 x 0.7 cm and shows marked increased vascularity with color Doppler interrogation.   Impression    Mass lesion inferior to the lower pole of the right lobe of the thyroid gland measuring 1.3 x 0.7 x 0.7 cm with imaging characteristics favoring a parathyroid adenoma.      Electronically signed by: ANEESH TEJADA MD  Date: 01/12/17       History: Hyperparathyroidism, abnormal ultrasound    Images were obtained 15 minutes and 2 hours following intravenous administration of 20 mCi technetium 99m sestamibi.    On delayed 2 hour images there is residual uptake within an inferior right parathyroid adenoma, corresponding to the ultrasound findings.   Impression    Right parathyroid adenoma      Electronically signed by: PATSY GRAHAM MD  Date: 01/31/17           Assessment & Plan:   Kylah was seen today for follow-up.    Diagnoses and all orders for this visit:    Hyperparathyroidism  -     CBC auto differential; Future  -     Basic metabolic panel; Future  -     HCG,  QUANTITATIVE, PREGNANCY; Future  -     Case Request Operating Room: PARATHYROIDECTOMY    1.5 hours    Parathyroid adenoma  -     Case Request Operating Room: PARATHYROIDECTOMY    1.5 hours      Genevieve presents today in follow-up of hypercalcemia and hyperparathyroidism.  She has had an ultrasound demonstrating an area consistent with parathyroid adenoma near the right inferior aspect of the thyroid gland.  Additionally, she has had sestamibi scan demonstrating abnormality in the same area.  We discussed that the specificity and sensitivity of this testing when they are in agreement approaches approximately 98%.  Due to this we discussed 2 options.  One is to perform traditional parathyroidectomy with a horizontal central incision, first inspecting the right inferior parathyroid and after removal checking intraoperative parathyroid hormone levels to ensure that the parathyroid hormone has dropped to an appropriate level.  If it does not drop appropriately at this point, additional parathyroid glands will be inspected and surgery would be continued until achieving an appropriate parathyroid hormone level with temperature operative testing.  We discussed a second option which would be to perform a minimally invasive parathyroidectomy and given the high specificity of the 2 tests in agreement.  In this case, a small incision would be made off of the midline to the right and the right parathyroid (most likely inferior but if needed superior could be assessed) would be removed.  Intraoperative parathyroid hormone levels would not be performed, making the surgery much more rapid.  The significant downside to this approach is the possibility of additional surgery in the future if there is a second adenoma or, less likely, she has 4 gland hyperplasia.  We discussed this at great length, she would like to proceed with the most minimally invasive surgery possible.  Therefore, we will proceed with right inferior  parathyroidectomy.  Additional risks of surgery were discussed including bleeding, infection, scarring, persistent hypercalcemia, chronic hypocalcemia, changes to the voice, weakness of the vocal cord on the right side.  She has a clear understanding and would like to proceed in the near future.    Minimally invasive right parathyroidectomy.      Feroz Arevalo MD

## 2017-02-01 NOTE — MR AVS SNAPSHOT
[facility]  [address] [facility]  [place] [address]  Phone: [phone]  Fax: [phone]                  Klyah Ruiz   2017 12:30 PM   Office Visit    Description:  Female : 1965   Provider:  Feroz Arevalo MD   Department:  [facility]           Reason for Visit     Follow-up           Diagnoses this Visit        Comments    Hyperparathyroidism    -  Primary            To Do List           Future Appointments        Provider Department Dept Phone    2017 1:00 PM LABORATORY, [facility] [address] [facility] [phone]    2017 1:00 PM Feroz Arevalo MD [facility] [phone]    4/3/2017 9:40 AM [facility] [phone]      Goals (5 Years of Data)     None      [facility]     [facility] Care Line -  Assistance  Registered nurses in the [facility] provide clinical advisement, health education, appointment booking, and other advisory services.  Call for this free service at [phone].             Medications           Message regarding Medications     Verify the changes and/or additions to your medication regime listed below are the same as discussed with your clinician today.  If any of these changes or additions are incorrect, please notify your healthcare provider.             Verify that the below list of medications is an accurate representation of the medications you are currently taking.  If none reported, the list may be blank. If incorrect, please contact your healthcare provider. Carry this list with you in case of emergency.                Clinical Reference Information           Vital Signs - Last Recorded  Most recent update: 2017 12:39 PM by Sindy Dupont MA    BP Pulse Temp Wt LMP BMI    118/76 66 99.1 °F (37.3 °C) (Tympanic) 74.1 kg (163 lb 5.8 oz) 2017 30.87 kg/m2      Blood Pressure          Most Recent Value    BP  118/76       Allergies as of 2/1/2017     Azithromycin      Immunizations Administered on Date of Encounter - 2/1/2017     None      Orders Placed During Today's Visit     Future Labs/Procedures Expected by Expires    Basic metabolic panel  2/1/2017 4/2/2018    CBC auto differential  2/1/2017 4/2/2018    HCG, QUANTITATIVE, PREGNANCY  2/1/2017 4/2/2018

## 2017-02-09 ENCOUNTER — TELEPHONE (OUTPATIENT)
Dept: OTOLARYNGOLOGY | Facility: CLINIC | Age: 52
End: 2017-02-09

## 2017-02-09 NOTE — TELEPHONE ENCOUNTER
I called patient and informed her that I was forwarding the message to Dr. Arevalo's MASindy, who does his scheduling.  Advised her that she should get a call back on tomorrow.  Patient verbalized understanding.  Message was forwarded.

## 2017-02-09 NOTE — TELEPHONE ENCOUNTER
----- Message from Rama Cummings sent at 2/9/2017 11:57 AM CST -----  Contact: pt  Pt wants to reschedule surgery, pt can be reached at [phone]///[id number]

## 2017-02-10 NOTE — TELEPHONE ENCOUNTER
Attempted returned call to patient. She is scheduled for surgery on 2/16/17, left message for a call back if she's wishing to reschedule her surgery date.

## 2017-02-10 NOTE — TELEPHONE ENCOUNTER
----- Message from Chritso Cummings sent at 2/10/2017 10:43 AM CST -----  Contact: Pt   Pt is requesting to rescheduled her procedure. /She can be reached at [phone]

## 2017-02-10 NOTE — TELEPHONE ENCOUNTER
Spoke with patient to change surgery date to 3/16/17 also spoke with Iraida in surgery scheduling to change date.

## 2017-03-13 ENCOUNTER — LAB VISIT (OUTPATIENT)
Dept: LAB | Facility: HOSPITAL | Age: 52
End: 2017-03-13
Attending: OTOLARYNGOLOGY
Payer: COMMERCIAL

## 2017-03-13 DIAGNOSIS — E21.3 HYPERPARATHYROIDISM: ICD-10-CM

## 2017-03-13 LAB
ANION GAP SERPL CALC-SCNC: 11 MMOL/L
BASOPHILS # BLD AUTO: 0.06 K/UL
BASOPHILS NFR BLD: 1.3 %
BUN SERPL-MCNC: 22 MG/DL
CALCIUM SERPL-MCNC: 10.1 MG/DL
CHLORIDE SERPL-SCNC: 106 MMOL/L
CO2 SERPL-SCNC: 22 MMOL/L
CREAT SERPL-MCNC: 1 MG/DL
DIFFERENTIAL METHOD: ABNORMAL
EOSINOPHIL # BLD AUTO: 0.5 K/UL
EOSINOPHIL NFR BLD: 10.6 %
ERYTHROCYTE [DISTWIDTH] IN BLOOD BY AUTOMATED COUNT: 13.2 %
EST. GFR  (AFRICAN AMERICAN): >60 ML/MIN/1.73 M^2
EST. GFR  (NON AFRICAN AMERICAN): >60 ML/MIN/1.73 M^2
GLUCOSE SERPL-MCNC: 83 MG/DL
HCG INTACT+B SERPL-ACNC: <1.2 MIU/ML
HCT VFR BLD AUTO: 39.8 %
HGB BLD-MCNC: 12.6 G/DL
LYMPHOCYTES # BLD AUTO: 1.5 K/UL
LYMPHOCYTES NFR BLD: 33.5 %
MCH RBC QN AUTO: 29 PG
MCHC RBC AUTO-ENTMCNC: 31.7 %
MCV RBC AUTO: 92 FL
MONOCYTES # BLD AUTO: 0.5 K/UL
MONOCYTES NFR BLD: 10.6 %
NEUTROPHILS # BLD AUTO: 2 K/UL
NEUTROPHILS NFR BLD: 43.8 %
PLATELET # BLD AUTO: 322 K/UL
PMV BLD AUTO: 10.3 FL
POTASSIUM SERPL-SCNC: 4.4 MMOL/L
RBC # BLD AUTO: 4.34 M/UL
SODIUM SERPL-SCNC: 139 MMOL/L
WBC # BLD AUTO: 4.51 K/UL

## 2017-03-13 PROCEDURE — 36415 COLL VENOUS BLD VENIPUNCTURE: CPT | Mod: PO

## 2017-03-13 PROCEDURE — 80048 BASIC METABOLIC PNL TOTAL CA: CPT

## 2017-03-13 PROCEDURE — 85025 COMPLETE CBC W/AUTO DIFF WBC: CPT

## 2017-03-13 PROCEDURE — 84702 CHORIONIC GONADOTROPIN TEST: CPT

## 2017-03-13 RX ORDER — AMOXICILLIN 500 MG
2 CAPSULE ORAL DAILY
COMMUNITY

## 2017-03-13 NOTE — PRE ADMISSION SCREENING
Pre op instructions reviewed with patient per phone:    To confirm, Your surgeon has instructed you:  Surgery is scheduled 03/16/17at per MD.      Please report to [facility] 1st floor main lobby by Dr Arevalo's office to instruct on arrival time.      INSTRUCTIONS IMPORTANT!!!  ¨ Do not eat, drink, or smoke after 12 midnight-including water. OK to brush teeth, no gum, candy or mints!    ¨ Take only these medicines with a small swallow of water-morning of surgery.  N/A      ____  Do not wear makeup, including mascara.  ____  No powder, lotions or creams to surgical area.  ____  Please remove all jewelry, including piercings and leave at home.  ____  No money or valuables needed. Please leave at home.  ____  Please bring identification and insurance information to hospital.  ____  If going home the same day, arrange for a ride home. You will not be able to   drive if Anesthesia was used.  ____  Children, under 12 years old, must remain in the waiting room with an adult.  They are not allowed in patient areas.  ____  Wear loose fitting clothing. Allow for dressings, bandages.  ____  Stop Aspirin, Ibuprofen, Motrin and Aleve at least 5-7 days before surgery, unless otherwise instructed by your doctor, or the nurse.   You MAY use Tylenol/acetaminophen until day of surgery.  ____  If you take diabetic medication, do not take am of surgery unless instructed by   Doctor.  ____ Stop taking any Fish Oil supplement or any Vitamins that contain Vitamin E at least 5 days prior to surgery.          Bathing Instructions-- The night before surgery and the morning prior to coming to the hospital:   -Do not shave the surgical area.   -Shower and wash your hair and body as usual with anti-bacterial  soap and shampoo.   -Rinse your hair and body completely.   -Use one packet of hibiclens to wash the surgical site (using your hand) gently for 5 minutes.  Do not scrub you skin too hard.   -Do not use hibiclens on  your head, face, or genitals.   -Do not wash with anti-bacterial soap after you use the hibiclens.   -Rinse your body thoroughly.   -Dry with clean, soft towel.  Do not use lotion, cream, deodorant, or powders on   the surgical site.    Use antibacterial soap in place of hibiclens if your surgery is on the head, face or genitals.         Surgical Site Infection    Prevention of surgical site infections:     -Keep incisions clean and dry.   -Do not soak/submerge incisions in water until completely healed.   -Do not apply lotions, powders, creams, or deodorants to site.   -Always make sure hands are cleaned with antibacterial soap/ alcohol-based   prior to touching the surgical site.  (This includes doctors, nurses, staff, and yourself.)    Signs and symptoms:   -Redness and pain around the area where you had surgery   -Drainage of cloudy fluid from your surgical wound   -Fever over 100.4  I have read or had read and explained to me, and understand the above information.

## 2017-03-15 ENCOUNTER — ANESTHESIA EVENT (OUTPATIENT)
Dept: SURGERY | Facility: HOSPITAL | Age: 52
End: 2017-03-15
Payer: COMMERCIAL

## 2017-03-15 ENCOUNTER — TELEPHONE (OUTPATIENT)
Dept: OTOLARYNGOLOGY | Facility: CLINIC | Age: 52
End: 2017-03-15

## 2017-03-15 NOTE — TELEPHONE ENCOUNTER
Attempted call to patient, phone  and hung up. Will try back later to give surgery arrival time of 7:30 am with Dr. Arevalo on 3/16/17. Needs reminder of npo at midnight with assuring details of check in process.

## 2017-03-16 ENCOUNTER — HOSPITAL ENCOUNTER (OUTPATIENT)
Facility: HOSPITAL | Age: 52
Discharge: HOME | End: 2017-03-16
Attending: OTOLARYNGOLOGY | Admitting: OTOLARYNGOLOGY
Payer: COMMERCIAL

## 2017-03-16 ENCOUNTER — ANESTHESIA (OUTPATIENT)
Dept: SURGERY | Facility: HOSPITAL | Age: 52
End: 2017-03-16
Payer: COMMERCIAL

## 2017-03-16 VITALS
BODY MASS INDEX: 30.59 KG/M2 | TEMPERATURE: 98 F | SYSTOLIC BLOOD PRESSURE: 125 MMHG | HEIGHT: 62 IN | WEIGHT: 166.25 LBS | HEART RATE: 70 BPM | OXYGEN SATURATION: 96 % | RESPIRATION RATE: 18 BRPM | DIASTOLIC BLOOD PRESSURE: 85 MMHG

## 2017-03-16 DIAGNOSIS — D35.1 PARATHYROID ADENOMA: Primary | ICD-10-CM

## 2017-03-16 DIAGNOSIS — E21.3 HYPERPARATHYROIDISM: ICD-10-CM

## 2017-03-16 LAB — PTH-INTACT SERPL-MCNC: 14.4 PG/ML

## 2017-03-16 PROCEDURE — 27201423 OPTIME MED/SURG SUP & DEVICES STERILE SUPPLY: Performed by: OTOLARYNGOLOGY

## 2017-03-16 PROCEDURE — 25000003 PHARM REV CODE 250: Performed by: OTOLARYNGOLOGY

## 2017-03-16 PROCEDURE — 63600175 PHARM REV CODE 636 W HCPCS: Performed by: NURSE ANESTHETIST, CERTIFIED REGISTERED

## 2017-03-16 PROCEDURE — 25000003 PHARM REV CODE 250: Performed by: ANESTHESIOLOGY

## 2017-03-16 PROCEDURE — 25000003 PHARM REV CODE 250: Performed by: NURSE ANESTHETIST, CERTIFIED REGISTERED

## 2017-03-16 PROCEDURE — 71000033 HC RECOVERY, INTIAL HOUR: Performed by: OTOLARYNGOLOGY

## 2017-03-16 PROCEDURE — 37000009 HC ANESTHESIA EA ADD 15 MINS: Performed by: OTOLARYNGOLOGY

## 2017-03-16 PROCEDURE — 88305 TISSUE EXAM BY PATHOLOGIST: CPT | Performed by: PATHOLOGY

## 2017-03-16 PROCEDURE — 36000707: Performed by: OTOLARYNGOLOGY

## 2017-03-16 PROCEDURE — 36000706: Performed by: OTOLARYNGOLOGY

## 2017-03-16 PROCEDURE — 63600175 PHARM REV CODE 636 W HCPCS: Performed by: OTOLARYNGOLOGY

## 2017-03-16 PROCEDURE — 88305 TISSUE EXAM BY PATHOLOGIST: CPT | Mod: 26,,, | Performed by: PATHOLOGY

## 2017-03-16 PROCEDURE — 88331 PATH CONSLTJ SURG 1 BLK 1SPC: CPT | Mod: 26,,, | Performed by: PATHOLOGY

## 2017-03-16 PROCEDURE — 83970 ASSAY OF PARATHORMONE: CPT

## 2017-03-16 PROCEDURE — 37000008 HC ANESTHESIA 1ST 15 MINUTES: Performed by: OTOLARYNGOLOGY

## 2017-03-16 PROCEDURE — 60500 EXPLORE PARATHYROID GLANDS: CPT | Mod: ,,, | Performed by: OTOLARYNGOLOGY

## 2017-03-16 RX ORDER — MIDAZOLAM HYDROCHLORIDE 1 MG/ML
INJECTION, SOLUTION INTRAMUSCULAR; INTRAVENOUS
Status: DISCONTINUED | OUTPATIENT
Start: 2017-03-16 | End: 2017-03-16

## 2017-03-16 RX ORDER — LORAZEPAM 2 MG/ML
0.25 INJECTION INTRAMUSCULAR ONCE AS NEEDED
Status: DISCONTINUED | OUTPATIENT
Start: 2017-03-16 | End: 2017-03-16 | Stop reason: HOSPADM

## 2017-03-16 RX ORDER — OXYCODONE AND ACETAMINOPHEN 5; 325 MG/1; MG/1
1 TABLET ORAL EVERY 4 HOURS PRN
Status: DISCONTINUED | OUTPATIENT
Start: 2017-03-16 | End: 2017-03-16 | Stop reason: HOSPADM

## 2017-03-16 RX ORDER — DEXAMETHASONE SODIUM PHOSPHATE 4 MG/ML
INJECTION, SOLUTION INTRA-ARTICULAR; INTRALESIONAL; INTRAMUSCULAR; INTRAVENOUS; SOFT TISSUE
Status: DISCONTINUED | OUTPATIENT
Start: 2017-03-16 | End: 2017-03-16

## 2017-03-16 RX ORDER — HYDROCODONE BITARTRATE AND ACETAMINOPHEN 5; 325 MG/1; MG/1
1 TABLET ORAL
Status: DISCONTINUED | OUTPATIENT
Start: 2017-03-16 | End: 2017-03-16 | Stop reason: HOSPADM

## 2017-03-16 RX ORDER — PHENYLEPHRINE HYDROCHLORIDE 10 MG/ML
INJECTION INTRAVENOUS
Status: DISCONTINUED | OUTPATIENT
Start: 2017-03-16 | End: 2017-03-16

## 2017-03-16 RX ORDER — MEPERIDINE HYDROCHLORIDE 50 MG/ML
12.5 INJECTION INTRAMUSCULAR; INTRAVENOUS; SUBCUTANEOUS ONCE AS NEEDED
Status: DISCONTINUED | OUTPATIENT
Start: 2017-03-16 | End: 2017-03-16 | Stop reason: HOSPADM

## 2017-03-16 RX ORDER — SODIUM CHLORIDE, SODIUM LACTATE, POTASSIUM CHLORIDE, CALCIUM CHLORIDE 600; 310; 30; 20 MG/100ML; MG/100ML; MG/100ML; MG/100ML
INJECTION, SOLUTION INTRAVENOUS CONTINUOUS
Status: DISCONTINUED | OUTPATIENT
Start: 2017-03-16 | End: 2017-03-16

## 2017-03-16 RX ORDER — PROPOFOL 10 MG/ML
VIAL (ML) INTRAVENOUS
Status: DISCONTINUED | OUTPATIENT
Start: 2017-03-16 | End: 2017-03-16

## 2017-03-16 RX ORDER — AMOXICILLIN 250 MG
1 CAPSULE ORAL 2 TIMES DAILY
Status: DISCONTINUED | OUTPATIENT
Start: 2017-03-16 | End: 2017-03-16 | Stop reason: HOSPADM

## 2017-03-16 RX ORDER — ONDANSETRON 2 MG/ML
INJECTION INTRAMUSCULAR; INTRAVENOUS
Status: DISCONTINUED | OUTPATIENT
Start: 2017-03-16 | End: 2017-03-16

## 2017-03-16 RX ORDER — HYDROMORPHONE HYDROCHLORIDE 2 MG/ML
0.2 INJECTION, SOLUTION INTRAMUSCULAR; INTRAVENOUS; SUBCUTANEOUS EVERY 5 MIN PRN
Status: DISCONTINUED | OUTPATIENT
Start: 2017-03-16 | End: 2017-03-16 | Stop reason: HOSPADM

## 2017-03-16 RX ORDER — OXYCODONE AND ACETAMINOPHEN 5; 325 MG/1; MG/1
2 TABLET ORAL EVERY 4 HOURS PRN
Status: DISCONTINUED | OUTPATIENT
Start: 2017-03-16 | End: 2017-03-16 | Stop reason: HOSPADM

## 2017-03-16 RX ORDER — SODIUM CHLORIDE, SODIUM LACTATE, POTASSIUM CHLORIDE, CALCIUM CHLORIDE 600; 310; 30; 20 MG/100ML; MG/100ML; MG/100ML; MG/100ML
125 INJECTION, SOLUTION INTRAVENOUS CONTINUOUS
Status: DISCONTINUED | OUTPATIENT
Start: 2017-03-16 | End: 2017-03-16

## 2017-03-16 RX ORDER — ROCURONIUM BROMIDE 10 MG/ML
INJECTION, SOLUTION INTRAVENOUS
Status: DISCONTINUED | OUTPATIENT
Start: 2017-03-16 | End: 2017-03-16

## 2017-03-16 RX ORDER — ONDANSETRON 2 MG/ML
4 INJECTION INTRAMUSCULAR; INTRAVENOUS EVERY 12 HOURS PRN
Status: DISCONTINUED | OUTPATIENT
Start: 2017-03-16 | End: 2017-03-16 | Stop reason: HOSPADM

## 2017-03-16 RX ORDER — OXYCODONE AND ACETAMINOPHEN 5; 325 MG/1; MG/1
1 TABLET ORAL EVERY 4 HOURS PRN
Qty: 30 TABLET | Refills: 0 | Status: SHIPPED | OUTPATIENT
Start: 2017-03-16 | End: 2017-03-31

## 2017-03-16 RX ORDER — CEFAZOLIN SODIUM 2 G/50ML
2 SOLUTION INTRAVENOUS ONCE
Status: COMPLETED | OUTPATIENT
Start: 2017-03-16 | End: 2017-03-16

## 2017-03-16 RX ORDER — SUCCINYLCHOLINE CHLORIDE 20 MG/ML
INJECTION INTRAMUSCULAR; INTRAVENOUS
Status: DISCONTINUED | OUTPATIENT
Start: 2017-03-16 | End: 2017-03-16

## 2017-03-16 RX ORDER — LIDOCAINE HCL/PF 100 MG/5ML
SYRINGE (ML) INTRAVENOUS
Status: DISCONTINUED | OUTPATIENT
Start: 2017-03-16 | End: 2017-03-16

## 2017-03-16 RX ADMIN — PHENYLEPHRINE HYDROCHLORIDE 100 MCG: 10 INJECTION INTRAVENOUS at 10:03

## 2017-03-16 RX ADMIN — EPHEDRINE SULFATE 10 MG: 50 INJECTION, SOLUTION INTRAMUSCULAR; INTRAVENOUS; SUBCUTANEOUS at 10:03

## 2017-03-16 RX ADMIN — DEXAMETHASONE SODIUM PHOSPHATE 4 MG: 4 INJECTION, SOLUTION INTRA-ARTICULAR; INTRALESIONAL; INTRAMUSCULAR; INTRAVENOUS; SOFT TISSUE at 10:03

## 2017-03-16 RX ADMIN — EPHEDRINE SULFATE 10 MG: 50 INJECTION, SOLUTION INTRAMUSCULAR; INTRAVENOUS; SUBCUTANEOUS at 11:03

## 2017-03-16 RX ADMIN — PHENYLEPHRINE HYDROCHLORIDE 200 MCG: 10 INJECTION INTRAVENOUS at 10:03

## 2017-03-16 RX ADMIN — LIDOCAINE HYDROCHLORIDE 40 MG: 20 INJECTION, SOLUTION INTRAVENOUS at 10:03

## 2017-03-16 RX ADMIN — ROCURONIUM BROMIDE 5 MG: 10 INJECTION, SOLUTION INTRAVENOUS at 10:03

## 2017-03-16 RX ADMIN — SUCCINYLCHOLINE CHLORIDE 100 MG: 20 INJECTION, SOLUTION INTRAMUSCULAR; INTRAVENOUS at 10:03

## 2017-03-16 RX ADMIN — ONDANSETRON 4 MG: 2 INJECTION, SOLUTION INTRAMUSCULAR; INTRAVENOUS at 10:03

## 2017-03-16 RX ADMIN — PROPOFOL 150 MG: 10 INJECTION, EMULSION INTRAVENOUS at 10:03

## 2017-03-16 RX ADMIN — SODIUM CHLORIDE, SODIUM LACTATE, POTASSIUM CHLORIDE, AND CALCIUM CHLORIDE: 600; 310; 30; 20 INJECTION, SOLUTION INTRAVENOUS at 10:03

## 2017-03-16 RX ADMIN — SODIUM CHLORIDE, SODIUM LACTATE, POTASSIUM CHLORIDE, AND CALCIUM CHLORIDE: 600; 310; 30; 20 INJECTION, SOLUTION INTRAVENOUS at 11:03

## 2017-03-16 RX ADMIN — CEFAZOLIN SODIUM 2 G: 2 SOLUTION INTRAVENOUS at 10:03

## 2017-03-16 RX ADMIN — MIDAZOLAM HYDROCHLORIDE 2 MG: 1 INJECTION, SOLUTION INTRAMUSCULAR; INTRAVENOUS at 10:03

## 2017-03-16 NOTE — OP NOTE
Operative Note       Surgery Date: 3/16/2017     Surgeon: Feroz Arevalo MD    Assistant:  None    Implants- None    Pre-op Diagnosis:  Primary Hyperparathryoidism    Post-op Diagnosis: rightlower parathyroid adenoma    Anesthesia: GETA    Technical Procedures Used:     Parathyroidectomy    Findings:  Parathyroid adenoma located behind the inferior pole of the right thyroid lobe    The right recurrent laryngeal nerve(s) was identified and preserved.      Estimated Blood Loss: minimal                     Specimens:   Specimens     Start     Ordered    03/16/17 1126  Specimen to Pathology - Surgery  Once      03/16/17 1127                 Indications: This patient presents with hyperparathyroidism and hypercalcemia.   Preoperative Sestamibi scan demonstrated demonstrated likely rightlower parathyroid adenoma.  Ultrasound was consistent with this finding.  Risks, benefits and alternatives were discussed at length with the understanding that if the adenoma is not located at the expected location from preoperative studies, surgery may be extended to identify and remove an adenoma if found.  They also understand that in some instances, all of the glands are hyperfunctional and 3 1/2 glands would be removed.  Written informed consent was obtained.      Procedure Details   The patient was seen in the Holding Room. The risks, benefits, complications, treatment options, and expected outcomes were discussed with the patient. The possibilities of reaction to medication, pulmonary aspiration, perforation of viscus, bleeding, recurrent infection, persistent hypercalcemia/hyperparathyroidism, temporary or permanent hypocalcemia/hyperparathyroidism, recurrently laryngeal nerve damage, the need for additional procedures, failure to diagnose a condition, and creating a complication requiring transfusion or operation were discussed with the patient. The patient concurred with the proposed plan, giving informed consent.  The patient was  taken to Operating Room, identified as Kylah Ruiz and the procedure verified as parathyroidectomy. A Time Out was held and the above information confirmed.    The patient was placed supine after induction of a general anesthetic.  The neck was extended and prepped and draped in standard fashion.  A 3 cm transverse cervical incision was created above the sternal notch within a natural skin fold.  The strap muscles were identified and divided at the midline.  Sharp dissection was used to mobilize the right thyroid lobe in a medial direction.   I then mobilized the lower pole.  An enlarged adenoma was identified.  The enlarged gland was circumferentially dissected and removed.      Frozen section was sent and returned as hypercellular parathyroid gland.    The wound was irrigated and inspected carefully.  The recurrent laryngeal nerve was left intact in its anatomic locations.  The strap muscles were closed with interrupted 3-0 Vicryl suture.  The platysma was closed with 4-0 Vicryl suture, and the skin incision was closed with a 5-0 Monocryl subcuticular closure.  Dermaflex was used to seal the incision.    Instrument, sponge, and needle counts were correct prior to closure and at the conclusion of the case.              Complications:  None; patient tolerated the procedure well.           Disposition: PACU - hemodynamically stable.           Condition: stable.    Attending Attestation: I performed the procedure.

## 2017-03-16 NOTE — DISCHARGE INSTRUCTIONS
Parathyroid Surgery    Your doctor has discovered that one or several of your parathyroid glands are enlarged. This may be the cause of your primary hyperparathyroidism. The parathyroid glands control the calcium in your blood. Primary hyperparathyroidism causes increased levels of blood calcium (hypercalcemia). This can lead to a number of problems throughout your body. To treat primary hyperparathyroidism, the enlarged gland or glands are often removed with surgery.  Preparing for surgery  After your surgery is scheduled, youll be told how to prepare. Follow all instructions, and be sure to ask any questions you have. To prepare for surgery, you may need to:  · Tell your doctor of any medications youre taking, including over the counter medications, vitamins and supplements. You may need to stop taking certain medications, such as aspirin or ibuprofen, a week or two before surgery.  · Have nothing to eat or drink for 6 to 8 hours before surgery. The doctor will give you specific instructions in advance.  · Arrange for an adult family member or friend to give you a ride home after surgery.  The day of surgery  Arrive for surgery on time. Before going to surgery:  · Youll need to register. This may be done ahead of time during an earlier visit, online, or over the phone. Have identification and insurance information ready.  · An IV (intravenous) line will be placed in a vein in your arm or hand. This is used to give fluids and medications.  · A doctor or nurse will discuss with you what type of pain medication (anesthesia) you will receive during surgery.  During surgery  You may need one or more parathyroid glands removed. The decision about how many glands to remove is often made during surgery. Be sure to ask your doctor for more information.  Removing the glands  · An incision is made in the neck.  · The enlarged parathyroid gland or glands are found and removed.  · In some cases, all four glands are  enlarged. When this happens, three and a half of the glands may be removed. The remaining half gland often makes enough hormone to replace four normal glands. In rare cases, all of the glands are removed. Parts of one gland are then placed in another location in the body, usually in the neck or arm. This is called a parathyroid autotransplantation. The moved gland continues to work from this new location.  · When surgery is complete, the incision is closed with sutures (stitches), strips of surgical tape, or surgical glue.     Risk and complications  Your doctor will discuss the risks and possible complications of surgery with you. These include:  · Injury to laryngeal nerves that supply the vocal cords  · Failure to locate the enlarged gland or glands, requiring more surgery  · Bleeding  · Infection  · Reaction to anesthesia  · Thyroid gland complications  · Low calcium and parathyroid levels (hypoparathyroidism)      Your recovery  Recovery from parathyroid surgery is usually quick. You may go home on the day of surgery or you may need to stay overnight. Once youre ready to go home, youll be given instructions for how to care for yourself. Follow the instructions carefully.        When to call your doctor  Call your doctor if you notice any of the following during your recovery:  · Numbness or tingling in the fingertips or around the mouth  · Muscle cramping or spasms  · Neck swelling  · Fever over 100.4°F (38.0°C)  · Increasing redness, swelling, or drainage at the incision site  · Nausea or vomiting  · Hoarse voice that worsens  · Trouble breathing  · Trouble swallowing  · Irregular heartbeat   Date Last Reviewed: 8/13/2014 © 2000-2016 [employer]. [address] [place] [address], [place] [address]. All rights reserved. This information is not intended as a substitute for professional medical care. Always follow your healthcare professional's instructions.

## 2017-03-16 NOTE — ANESTHESIA PREPROCEDURE EVALUATION
03/16/2017  Kylah Ruiz is a 51 y.o., female.    [facility] Pre-op Assessment    I have reviewed the Patient Summary Reports.    I have reviewed the Nursing Notes.   I have reviewed the Medications.     Review of Systems  Anesthesia Hx:  No problems with previous Anesthesia    Social:  Former Smoker    Cardiovascular:  Cardiovascular Normal     Pulmonary:  Pulmonary Normal    Renal/:  Renal/ Normal     Endocrine:   hyperparathyroidism       Physical Exam  General:  Well nourished    Airway/Jaw/Neck:  Airway Findings:      Chest/Lungs:  Chest/Lungs Findings: Clear to auscultation, Normal Respiratory Rate     Heart/Vascular:  Heart Findings: Rate: Normal        Mental Status:  Mental Status Findings:  Cooperative         Anesthesia Plan  Type of Anesthesia, risks & benefits discussed:  Anesthesia Type:  general  Patient's Preference:   Intra-op Monitoring Plan:   Intra-op Monitoring Plan Comments:   Post Op Pain Control Plan:   Post Op Pain Control Plan Comments:   Induction:   IV  Beta Blocker:  Patient is not currently on a Beta-Blocker (No further documentation required).       Informed Consent: Patient understands risks and agrees with Anesthesia plan.  Questions answered. Anesthesia consent signed with patient.  ASA Score: 2     Day of Surgery Review of History & Physical:  There are no significant changes.

## 2017-03-16 NOTE — ANESTHESIA POSTPROCEDURE EVALUATION
"Anesthesia Post Evaluation    Patient: Kylah Ruiz    Procedure(s) Performed: Procedure(s) (LRB):  PARATHYROIDECTOMY  1.5 hours  (Right)    Final Anesthesia Type: general  Patient location during evaluation: PACU  Patient participation: Yes- Able to Participate  Level of consciousness: awake and alert  Post-procedure vital signs: reviewed and stable  Pain management: adequate  Airway patency: patent  PONV status at discharge: No PONV  Anesthetic complications: no      Cardiovascular status: stable  Respiratory status: unassisted  Hydration status: euvolemic  Follow-up not needed.        Visit Vitals    /85 (BP Location: Left arm, Patient Position: Sitting, BP Method: Automatic)    Pulse 70    Temp 36.6 °C (97.9 °F) (Oral)    Resp 18    Ht 5' 2" (1.575 m)    Wt 75.4 kg (166 lb 3.6 oz)    LMP 02/18/2017    SpO2 96%    Breastfeeding No    BMI 30.4 kg/m2       Pain/Lucy Score: Pain Assessment Performed: Yes (3/16/2017  1:00 PM)  Presence of Pain: complains of pain/discomfort (3/16/2017  1:00 PM)  Lucy Score: 10 (3/16/2017 12:45 PM)      "

## 2017-03-16 NOTE — ANESTHESIA RELEASE NOTE
"Anesthesia Release from PACU Note    Patient: Kylah Ruiz    Procedure(s) Performed: Procedure(s) (LRB):  PARATHYROIDECTOMY  1.5 hours  (Right)    Anesthesia type: general    Post pain: Adequate analgesia    Post assessment: no apparent anesthetic complications, tolerated procedure well and no evidence of recall    Last Vitals:   Visit Vitals    /63 (BP Location: Right arm, Patient Position: Sitting, BP Method: Automatic)    Pulse (!) 58    Temp 36.7 °C (98 °F)    Resp 16    Ht 5' 2" (1.575 m)    Wt 75.4 kg (166 lb 3.6 oz)    LMP 02/18/2017    SpO2 97%    Breastfeeding No    BMI 30.4 kg/m2       Post vital signs: stable    Level of consciousness: responds to stimulation    Nausea/Vomiting: no nausea/no vomiting    Complications: none    Airway Patency: patent    Respiratory: unassisted    Cardiovascular: stable and blood pressure at baseline    Hydration: euvolemic  "

## 2017-03-16 NOTE — H&P
Chief Complaint: Follow-up (review results of parathyroid scan )     HPI: Kylah is a 51 y.o. female who I was asked to see in consultation for evaluation of the following issue(s):     Fragile bones that easily fracture (osteoporosis)- unknown, of note she does quite a lot of heavy weight lifting  Kidney stones- no  Excessive urination- YES  Abdominal pain-no  Tiring easily or weakness- no  Depression or forgetfulness- no  Bone and joint pain- no  Frequent complaints of illness with no apparent cause- no  Nausea, vomiting or loss of appetite- no     Notes Recorded by Meg Lemus MD on 1/9/2017 at 1:11 PM  Results were discussed with the patient by phone. Have advised thyroid/parathyroid ultrasound based on abnormal labs. She is agreeable. Please schedule.         Ref Range & Units 2wk ago  11mo ago     PTH, Intact 9.0 - 77.0 pg/mL 135.0 (H) 108.0 (H)   Resulting Agency   [facility]       Specimen Collected: 01/06/17  1:55 PM Last Resulted: 01/06/17 10:17 PM                    Ref Range & Units 2wk ago    Calcium, Ion 1.06 - 1.42 mmol/L 1.43 (H)   Resulting Agency   [facility]       Specimen Collected: 01/06/17               Ref Range & Units 11mo ago    Vit D, 25-Hydroxy 30 - 96 ng/mL 67         Ref Range & Units 11mo ago    Sodium 136 - 145 mmol/L 136   Potassium 3.5 - 5.1 mmol/L 4.9   Chloride 95 - 110 mmol/L 105   CO2 23 - 29 mmol/L 24   Glucose 70 - 110 mg/dL 96   BUN, Bld 6 - 20 mg/dL 23 (H)   Creatinine 0.5 - 1.4 mg/dL 0.9   Calcium 8.7 - 10.5 mg/dL 10.9 (H)   Anion Gap 8 - 16 mmol/L 7 (L)   eGFR if African American >60 mL/min/1.73 m^2 >60   eGFR if non African American >60 mL/min/1.73 m^2 >60   Comments: Calculation used to obtain the estimated glomerular filtration   rate (eGFR) is the CKD-EPI equation. Since race is unknown   in our information system, the eGFR values for   -American and Non--American patients are given   for each creatinine result.     Resulting Agency   [facility]        Specimen Collected: 16  8:26              Review of Systems: Negative unless checked off.  Gen:  []fever   []fatigue  HENT:  []nosebleeds  []dental problem   Eyes:  []photophobia  []visual disturbance  Resp:  []chest tightness []wheezing  Card:  []chest pain  []leg swelling  GI:  []abdominal pain []blood in stool  :  []dysuria  []hematuria  Musc:  []joint swelling  []gait problem  Skin:  []color change  []pallor  Neuro:  []seizures  []numbness  Hem:  []bruise/bleed easily  Psych:  []hallucinations  []behavioral problems  Allergy/Imm: is allergic to azithromycin.     Her meds, allergies, medical, surgical, social & family histories were reviewed & updated:  - She currently has no medications in their medication list.  - She  has no past medical history on file.   - She  does not have a problem list on file.   - She  has a past surgical history that includes gastric sleeve; Femur fracture with repair; Cosmetic surgery; and  section.  - She  reports that she quit smoking about 7 years ago. She has never used smokeless tobacco. She reports that she does not drink alcohol or use illicit drugs.  - Her family history includes Heart disease in her father.  - She is allergic to azithromycin.     Objective:      Physical Exam:  Vitals:        Visit Vitals    /76    Pulse 66    Temp 99.1 °F (37.3 °C) (Tympanic)    Wt 74.1 kg (163 lb 5.8 oz)    LMP 2017    BMI 30.87 kg/m2      General appearance: Well developed, well nourished     Eyes: Extraocular motions intact, PERRL     Communication: no hoarseness, no dysphonia     Ears: Otoscopy of external auditory canals and tympanic membranes was normal, clinical speech reception thresholds grossly intact, no mass/lesion of auricle.  Nose: No masses/lesions of external nose, nasal mucosa, septum, and turbinates were within normal limits.  Mouth: No mass/lesion of lips, teeth, gums, hard/soft palate, tongue, tonsils, or oropharynx.     Cardiovascular:  No pedal edema; Radial Pulses +2      Neck & Lymphatics: No cervical lymphadenopathy, no neck mass/crepitus/ asymmetry, trachea is midline, no thyroid enlargement/tenderness/mass.     Psych: Oriented x3, Alert with normal mood and affect.      Respiration/Chest: Symmetric expansion during respiration, normal respiratory effort.     Skin: Warm and intact. No ulcerations of face, scalp, neck.     Thyroid ultrasound    Indication: Elevated parathyroid hormone levels.    Findings: The right lobe of the thyroid gland measures 4.7 x 1.8 x 1.5 cm.  The isthmus measures 0.2 cm in thickness.  The left lobe measures 5.2 x 1.4 x 1.7 cm.  No thyroid nodules are seen.    Inferior to the lower pole of the right lobe there is a hypoechoic somewhat heterogeneous nodule measuring 1.3 x 0.7 x 0.7 cm and shows marked increased vascularity with color Doppler interrogation.   Impression    Mass lesion inferior to the lower pole of the right lobe of the thyroid gland measuring 1.3 x 0.7 x 0.7 cm with imaging characteristics favoring a parathyroid adenoma.      Electronically signed by: ANEESH TEJADA MD  Date: 01/12/17         History: Hyperparathyroidism, abnormal ultrasound    Images were obtained 15 minutes and 2 hours following intravenous administration of 20 mCi technetium 99m sestamibi.    On delayed 2 hour images there is residual uptake within an inferior right parathyroid adenoma, corresponding to the ultrasound findings.   Impression    Right parathyroid adenoma      Electronically signed by: PATSY GRAHAM MD  Date: 01/31/17             Assessment & Plan:   Kylah was seen today for follow-up.     Diagnoses and all orders for this visit:     Hyperparathyroidism  - CBC auto differential; Future  - Basic metabolic panel; Future  - HCG, QUANTITATIVE, PREGNANCY; Future  - Case Request Operating Room: PARATHYROIDECTOMY     1.5 hours     Parathyroid adenoma  - Case Request Operating Room: PARATHYROIDECTOMY     1.5  aj Vallejo presents today in follow-up of hypercalcemia and hyperparathyroidism. She has had an ultrasound demonstrating an area consistent with parathyroid adenoma near the right inferior aspect of the thyroid gland. Additionally, she has had sestamibi scan demonstrating abnormality in the same area. We discussed that the specificity and sensitivity of this testing when they are in agreement approaches approximately 98%. Due to this we discussed 2 options. One is to perform traditional parathyroidectomy with a horizontal central incision, first inspecting the right inferior parathyroid and after removal checking intraoperative parathyroid hormone levels to ensure that the parathyroid hormone has dropped to an appropriate level. If it does not drop appropriately at this point, additional parathyroid glands will be inspected and surgery would be continued until achieving an appropriate parathyroid hormone level with temperature operative testing. We discussed a second option which would be to perform a minimally invasive parathyroidectomy and given the high specificity of the 2 tests in agreement. In this case, a small incision would be made off of the midline to the right and the right parathyroid (most likely inferior but if needed superior could be assessed) would be removed. Intraoperative parathyroid hormone levels would not be performed, making the surgery much more rapid. The significant downside to this approach is the possibility of additional surgery in the future if there is a second adenoma or, less likely, she has 4 gland hyperplasia. We discussed this at great length, she would like to proceed with the most minimally invasive surgery possible. Therefore, we will proceed with right inferior parathyroidectomy. Additional risks of surgery were discussed including bleeding, infection, scarring, persistent hypercalcemia, chronic hypocalcemia, changes to the voice, weakness of the vocal cord on the  right side. She has a clear understanding and would like to proceed in the near future.

## 2017-03-16 NOTE — IP AVS SNAPSHOT
[address] [place] [address]           Patient Discharge Instructions     Our goal is to set you up for success. This packet includes information on your condition, medications, and your home care. It will help you to care for yourself so you don't get sicker and need to go back to the hospital.     Please ask your nurse if you have any questions.        There are many details to remember when preparing to leave the hospital. Here is what you will need to do:    1. Take your medicine. If you are prescribed medications, review your Medication List in the following pages. You may have new medications to  at the pharmacy and others that you'll need to stop taking. Review the instructions for how and when to take your medications. Talk with your doctor or nurses if you are unsure of what to do.     2. Go to your follow-up appointments. Specific follow-up information is listed in the following pages. Your may be contacted by a transition nurse or clinical provider about future appointments. Be sure we have all of the phone numbers to reach you, if needed. Please contact your provider's office if you are unable to make an appointment.     3. Watch for warning signs. Your doctor or nurse will give you detailed warning signs to watch for and when to call for assistance. These instructions may also include educational information about your condition. If you experience any of warning signs to your health, call your doctor.               ** Verify the list of medication(s) below is accurate and up to date. Carry this with you in case of emergency. If your medications have changed, please notify your healthcare provider.             Medication List      START taking these medications        Additional Info                      oxycodone-acetaminophen 5-325 mg per tablet   Commonly known as:  PERCOCET   Quantity:  30 tablet   Refills:  0   Dose:  1 tablet     Instructions:  Take 1 tablet by mouth every 4 (four) hours as needed for Pain (2 tabs for severe pain).     Begin Date    AM    Noon    PM    Bedtime         CONTINUE taking these medications        Additional Info                      DHEA 50 mg Tab   Refills:  0   Dose:  2 tablet   Generic drug:  prasterone (dhea)    Instructions:  Take 2 tablets by mouth once daily.     Begin Date    AM    Noon    PM    Bedtime       fish oil-omega-3 fatty acids 300-1,000 mg capsule   Refills:  0   Dose:  2 g    Instructions:  Take 2 g by mouth once daily.     Begin Date    AM    Noon    PM    Bedtime       UNABLE TO FIND   Refills:  0    Instructions:  medication name: phermovex     Begin Date    AM    Noon    PM    Bedtime            Where to Get Your Medications      These medications were sent to [id number] - [place] - [address] AT [facility] [address]  [address] [place] [address]     Phone:  [phone]     oxycodone-acetaminophen 5-325 mg per tablet                  Please bring to all follow up appointments:    1. A copy of your discharge instructions.  2. All medicines you are currently taking in their original bottles.  3. Identification and insurance card.    Please arrive 15 minutes ahead of scheduled appointment time.    Please call 24 hours in advance if you must reschedule your appointment and/or time.        Your Scheduled Appointments     Apr 03, 2017  9:40 AM CDT   Mammo Screening with [facility]   [facility]    [address] [address] [facility] [address] [phone]                  Discharge Instructions         Parathyroid Surgery    Your doctor has discovered that one or several of your parathyroid glands are enlarged. This may be the cause of your primary hyperparathyroidism. The parathyroid glands control the calcium in your blood. Primary hyperparathyroidism causes increased levels of blood calcium  (hypercalcemia). This can lead to a number of problems throughout your body. To treat primary hyperparathyroidism, the enlarged gland or glands are often removed with surgery.  Preparing for surgery  After your surgery is scheduled, youll be told how to prepare. Follow all instructions, and be sure to ask any questions you have. To prepare for surgery, you may need to:  · Tell your doctor of any medications youre taking, including over the counter medications, vitamins and supplements. You may need to stop taking certain medications, such as aspirin or ibuprofen, a week or two before surgery.  · Have nothing to eat or drink for 6 to 8 hours before surgery. The doctor will give you specific instructions in advance.  · Arrange for an adult family member or friend to give you a ride home after surgery.  The day of surgery  Arrive for surgery on time. Before going to surgery:  · Youll need to register. This may be done ahead of time during an earlier visit, online, or over the phone. Have identification and insurance information ready.  · An IV (intravenous) line will be placed in a vein in your arm or hand. This is used to give fluids and medications.  · A doctor or nurse will discuss with you what type of pain medication (anesthesia) you will receive during surgery.  During surgery  You may need one or more parathyroid glands removed. The decision about how many glands to remove is often made during surgery. Be sure to ask your doctor for more information.  Removing the glands  · An incision is made in the neck.  · The enlarged parathyroid gland or glands are found and removed.  · In some cases, all four glands are enlarged. When this happens, three and a half of the glands may be removed. The remaining half gland often makes enough hormone to replace four normal glands. In rare cases, all of the glands are removed. Parts of one gland are then placed in another location in the body, usually in the neck or arm. This  is called a parathyroid autotransplantation. The moved gland continues to work from this new location.  · When surgery is complete, the incision is closed with sutures (stitches), strips of surgical tape, or surgical glue.     Risk and complications  Your doctor will discuss the risks and possible complications of surgery with you. These include:  · Injury to laryngeal nerves that supply the vocal cords  · Failure to locate the enlarged gland or glands, requiring more surgery  · Bleeding  · Infection  · Reaction to anesthesia  · Thyroid gland complications  · Low calcium and parathyroid levels (hypoparathyroidism)      Your recovery  Recovery from parathyroid surgery is usually quick. You may go home on the day of surgery or you may need to stay overnight. Once youre ready to go home, youll be given instructions for how to care for yourself. Follow the instructions carefully.        When to call your doctor  Call your doctor if you notice any of the following during your recovery:  · Numbness or tingling in the fingertips or around the mouth  · Muscle cramping or spasms  · Neck swelling  · Fever over 100.4°F (38.0°C)  · Increasing redness, swelling, or drainage at the incision site  · Nausea or vomiting  · Hoarse voice that worsens  · Trouble breathing  · Trouble swallowing  · Irregular heartbeat   Date Last Reviewed: 8/13/2014  © 0979-1154 [employer] [address] [place] [address]. All rights reserved. This information is not intended as a substitute for professional medical care. Always follow your healthcare professional's instructions.            Primary Diagnosis     Your primary diagnosis was:  Not on File      Admission Information     Date & Time Provider Department CSN    3/16/2017  7:34 AM Feroz Arevalo MD [facility] -  [id number]      Care Providers     Provider Role Specialty Primary office phone    Feroz Arevalo MD Attending Provider Otolaryngology [phone]    Feroz  "MD Mickey Surgeon  Otolaryngology [phone]      Your Vitals Were     BP Pulse Temp Resp Height Weight    125/85 (BP Location: Left arm, Patient Position: Sitting, BP Method: Automatic) 70 97.9 °F (36.6 °C) (Oral) 18 5' 2" (1.575 m) 75.4 kg (166 lb 3.6 oz)    Last Period SpO2 BMI          02/18/2017 96% 30.4 kg/m2        Recent Lab Values        1/6/2017                           1:55 PM           A1C 5.1           Comment for A1C at  1:55 PM on 1/6/2017:  According to ADA guidelines, hemoglobin A1C <7.0% represents  optimal control in non-pregnant diabetic patients.  Different  metrics may apply to specific populations.   Standards of Medical Care in Diabetes - 2016.  For the purpose of screening for the presence of diabetes:  <5.7%     Consistent with the absence of diabetes  5.7-6.4%  Consistent with increasing risk for diabetes   (prediabetes)  >or=6.5%  Consistent with diabetes  Currently no consensus exists for use of hemoglobin A1C  for diagnosis of diabetes for children.        Pending Labs     Order Current Status    Specimen to Pathology - Surgery In process      Allergies as of 3/16/2017        Reactions    Azithromycin Nausea And Vomiting      [facility]     [facility] Nurse Care Line - 24/7 Assistance  Unless otherwise directed by your provider, please contact [facility], our nurse care line that is available for 24/7 assistance.     Registered nurses in the [facility] provide clinical advisement, health education, appointment booking, and other advisory services.  Call for this free service at [phone].        Advance Directives     An advance directive is a document which, in the event you are no longer able to make decisions for yourself, tells your healthcare team what kind of treatment you do or do not want to receive, or who you would like to make those decisions for you.  If you do not currently have an advance directive, [facility] encourages you to create one.  " For more information call:  [phone]), [phone] ([phone]),  or log on to [email or web address].        Language Assistance Services     ATTENTION: Language assistance services are available, free of charge. Please call [phone].      ATENCIÓN: Si habla español, tiene a turner disposición servicios gratuitos de asistencia lingüística. Llame al [phone].     [facility] Ý: N?u b?n nói Ti?ng Vi?t, có các d?ch v? h? tr? ngôn ng? mi?n phí dành cho b?n. G?i s? [phone].         [facility] complies with applicable Federal civil rights laws and does not discriminate on the basis of race, color, national origin, age, disability, or sex.

## 2017-03-16 NOTE — NURSING
Discharge instructions reviewed with patient. Pt verbalized understanding. Pt discharged via wheelchair, accompanied by family, to personal vehicle.

## 2017-03-20 NOTE — DISCHARGE SUMMARY
[facility]  Discharge Note  Short Stay    Admit Date: 3/16/2017    Discharge Date and Time: 03/19/2017 9:00 PM     Attending Physician: No att. providers found     Discharge Provider: Feroz Arevalo    Diagnoses:  Active Hospital Problems    Diagnosis  POA   No active problems to display.      Resolved Hospital Problems    Diagnosis Date Resolved POA    *Parathyroid adenoma [D35.1] 03/16/2017 Yes    Hyperparathyroidism [E21.3] 03/16/2017 Yes       Discharged Condition: good    Hospital Course: Patient was admitted for an outpatient procedure and tolerated the procedure well with no complications.    Final Diagnoses: Same as principle problem    Disposition: Home or Self Care    Follow up/Patient Instructions:    Medications:  Reconciled Home Medications:   Discharge Medication List as of 3/16/2017  2:31 PM      START taking these medications    Details   oxycodone-acetaminophen (PERCOCET) 5-325 mg per tablet Take 1 tablet by mouth every 4 (four) hours as needed for Pain (2 tabs for severe pain)., Starting 3/16/2017, Until Discontinued, Normal         CONTINUE these medications which have NOT CHANGED    Details   fish oil-omega-3 fatty acids 300-1,000 mg capsule Take 2 g by mouth once daily., Until Discontinued, Historical Med      prasterone, dhea, (DHEA) 50 mg Tab Take 2 tablets by mouth once daily., Until Discontinued, Historical Med      UNABLE TO FIND medication name: phermovex, Until Discontinued, Historical Med             No discharge procedures on file.      Discharge Procedure Orders (must include Diet, Follow-up, Activity):  No discharge procedures on file.

## 2017-03-22 ENCOUNTER — PATIENT MESSAGE (OUTPATIENT)
Dept: OTOLARYNGOLOGY | Facility: CLINIC | Age: 52
End: 2017-03-22

## 2017-03-28 ENCOUNTER — PATIENT MESSAGE (OUTPATIENT)
Dept: OTOLARYNGOLOGY | Facility: CLINIC | Age: 52
End: 2017-03-28

## 2017-03-31 ENCOUNTER — OFFICE VISIT (OUTPATIENT)
Dept: OTOLARYNGOLOGY | Facility: CLINIC | Age: 52
End: 2017-03-31
Payer: COMMERCIAL

## 2017-03-31 ENCOUNTER — LAB VISIT (OUTPATIENT)
Dept: LAB | Facility: HOSPITAL | Age: 52
End: 2017-03-31
Attending: OTOLARYNGOLOGY
Payer: COMMERCIAL

## 2017-03-31 VITALS
BODY MASS INDEX: 30.12 KG/M2 | HEART RATE: 63 BPM | WEIGHT: 164.69 LBS | DIASTOLIC BLOOD PRESSURE: 65 MMHG | SYSTOLIC BLOOD PRESSURE: 113 MMHG

## 2017-03-31 DIAGNOSIS — E21.3 HYPERPARATHYROIDISM: ICD-10-CM

## 2017-03-31 DIAGNOSIS — E21.3 HYPERPARATHYROIDISM: Primary | ICD-10-CM

## 2017-03-31 LAB
CA-I BLDV-SCNC: 1.24 MMOL/L
PTH-INTACT SERPL-MCNC: 51 PG/ML

## 2017-03-31 PROCEDURE — 82330 ASSAY OF CALCIUM: CPT

## 2017-03-31 PROCEDURE — 83970 ASSAY OF PARATHORMONE: CPT

## 2017-03-31 PROCEDURE — 99024 POSTOP FOLLOW-UP VISIT: CPT | Mod: S$GLB,,, | Performed by: PHYSICIAN ASSISTANT

## 2017-03-31 PROCEDURE — 99999 PR PBB SHADOW E&M-EST. PATIENT-LVL III: CPT | Mod: PBBFAC,,, | Performed by: PHYSICIAN ASSISTANT

## 2017-03-31 PROCEDURE — 36415 COLL VENOUS BLD VENIPUNCTURE: CPT

## 2017-03-31 NOTE — PROGRESS NOTES
"Subjective:   Patient: Kylah Ruiz [id number], :1965   Visit date:3/31/2017 9:02 AM    Chief Complaint:  Follow-up (post op)    HPI:  Kylah is a 51 y.o. female who is here for follow-up after surgery:    Subjective: She says she's doing very well since surgery.  Getting back to her usual activities; asking about lifting weights and running.  Denies dysphagia.  Say her voice seems slightly hoarse early in the mornings; doesn't last long and it's not everyday.  Denies any pain or drainage at the incision.  Reports having frequent numbness and tingling in her right hand; denies hand weakness.  She also says her left triceps "locks out" occasionally.  Denies fever.    Surgery date: 3/16/2017    Operations performed: parathyroidectomy    Pathology:  Parathyroid adenoma    Cultures:  n/a    Review of Systems:  -     Allergic/Immunologic: is allergic to azithromycin..  -     Constitutional: Current temp:      Her meds, allergies, medical, surgical, social & family histories were reviewed & updated:  -     She has a current medication list which includes the following prescription(s): fish oil-omega-3 fatty acids, prasterone (dhea), and UNABLE TO FIND.  -     She  has a past medical history of Encounter for blood transfusion.   -     She  does not have any pertinent problems on file.   -     She  has a past surgical history that includes gastric sleeve; Cosmetic surgery;  section; and ORIF femur fracture (Left).  -     She  reports that she quit smoking about 8 years ago. She has never used smokeless tobacco. She reports that she does not drink alcohol or use illicit drugs.  -     Her family history includes Heart disease in her father.  -     She is allergic to azithromycin.    Objective:     Physical Exam:  Vitals:  /65  Pulse 63  Wt 74.7 kg (164 lb 10.9 oz)  LMP 2017  BMI 30.12 kg/m2  General appearance:  Well developed, well nourished, no apparent distress.  No hoarseness.      Surgical " site: clean, dry, intact.  No erythema or drainage noted.  Dermabond has started to peel away from her skin.    Assessment & Plan:   Kylah was seen today for follow-up.    Diagnoses and all orders for this visit:    Hyperparathyroidism  -     PTH, intact; Future  -     CALCIUM, IONIZED; Future    Will check PTH and ionized calcium today.  If normal, will followup PRN.  Would like to see her calcium level today; likely start Tums ultra 1000mg BID for 3 weeks and then recheck labs.

## 2017-04-03 ENCOUNTER — TELEPHONE (OUTPATIENT)
Dept: OTOLARYNGOLOGY | Facility: CLINIC | Age: 52
End: 2017-04-03

## 2017-04-03 NOTE — TELEPHONE ENCOUNTER
Called and discussed lab results and Dr. Arevalo's recommendations with patient today.  Will will see her back PRN.  She voiced understanding and will call with any issues.      ----- Message from Feroz Arevalo MD sent at 4/3/2017  1:02 PM CDT -----  Those labs look perfect.  No need to repeat labs.  She can take the TUMS for a few weeks while her bones are taking up a lot of the calcium from her diet.  F/u PRN.       ----- Message -----     From: Jennifer Cummings PA-C     Sent: 4/3/2017   7:20 AM       To: Feroz Arevalo MD    Here are her latest labs.  Still repeat in 3 weeks?  Told her on Friday to take Tums Ultra 1000mg BID

## 2017-04-04 ENCOUNTER — TELEPHONE (OUTPATIENT)
Dept: OTOLARYNGOLOGY | Facility: CLINIC | Age: 52
End: 2017-04-04

## 2017-04-04 DIAGNOSIS — E21.3 HYPERPARATHYROIDISM: Primary | ICD-10-CM

## 2017-04-04 NOTE — TELEPHONE ENCOUNTER
Pt states having muscle twitching in right arm and has some tingling in her fingers.  Pt took tums extra strength yesterday and gave her diarrhea.  She tolerated well the tums 750mg x3days but still had tingling.  Pt is also feeling very tired.  Hasnt had any tums since yesterday afternoon.  Please advise.

## 2017-04-04 NOTE — TELEPHONE ENCOUNTER
----- Message from Ceferino Ervin sent at 4/4/2017  3:37 PM CDT -----  Contact: pt  She's calling in regards to post surgery, pt has a lot of muscle movement, please advise, [phone] (home)

## 2017-04-07 NOTE — TELEPHONE ENCOUNTER
Pt scheduled to have labs drawn on Monday 4/10/17 at the [facility].  Pt verbalized understanding.

## 2017-05-03 ENCOUNTER — TELEPHONE (OUTPATIENT)
Dept: OTOLARYNGOLOGY | Facility: CLINIC | Age: 52
End: 2017-05-03

## 2017-05-04 ENCOUNTER — LAB VISIT (OUTPATIENT)
Dept: LAB | Facility: HOSPITAL | Age: 52
End: 2017-05-04
Attending: OTOLARYNGOLOGY
Payer: COMMERCIAL

## 2017-05-04 ENCOUNTER — PATIENT MESSAGE (OUTPATIENT)
Dept: OTOLARYNGOLOGY | Facility: CLINIC | Age: 52
End: 2017-05-04

## 2017-05-04 DIAGNOSIS — E21.3 HYPERPARATHYROIDISM: ICD-10-CM

## 2017-05-04 LAB
CALCIUM SERPL-MCNC: 9.2 MG/DL
PTH-INTACT SERPL-MCNC: 63.6 PG/ML

## 2017-05-04 PROCEDURE — 36415 COLL VENOUS BLD VENIPUNCTURE: CPT

## 2017-05-04 PROCEDURE — 82310 ASSAY OF CALCIUM: CPT

## 2017-05-04 PROCEDURE — 83970 ASSAY OF PARATHORMONE: CPT

## 2018-04-26 ENCOUNTER — OFFICE VISIT (OUTPATIENT)
Dept: FAMILY MEDICINE | Facility: CLINIC | Age: 53
End: 2018-04-26
Payer: COMMERCIAL

## 2018-04-26 VITALS
RESPIRATION RATE: 18 BRPM | WEIGHT: 141.63 LBS | BODY MASS INDEX: 26.74 KG/M2 | HEART RATE: 70 BPM | TEMPERATURE: 99 F | SYSTOLIC BLOOD PRESSURE: 120 MMHG | HEIGHT: 61 IN | DIASTOLIC BLOOD PRESSURE: 64 MMHG | OXYGEN SATURATION: 99 %

## 2018-04-26 DIAGNOSIS — Z72.0 TOBACCO ABUSE: ICD-10-CM

## 2018-04-26 DIAGNOSIS — J01.00 ACUTE NON-RECURRENT MAXILLARY SINUSITIS: Primary | ICD-10-CM

## 2018-04-26 PROCEDURE — 99999 PR PBB SHADOW E&M-EST. PATIENT-LVL IV: CPT | Mod: PBBFAC,,, | Performed by: NURSE PRACTITIONER

## 2018-04-26 PROCEDURE — 99213 OFFICE O/P EST LOW 20 MIN: CPT | Mod: S$GLB,,, | Performed by: NURSE PRACTITIONER

## 2018-04-26 RX ORDER — AMOXICILLIN AND CLAVULANATE POTASSIUM 875; 125 MG/1; MG/1
1 TABLET, FILM COATED ORAL EVERY 12 HOURS
Qty: 20 TABLET | Refills: 0 | Status: SHIPPED | OUTPATIENT
Start: 2018-04-26

## 2018-04-26 RX ORDER — ASCORBIC ACID 500 MG
500 TABLET ORAL 3 TIMES DAILY
COMMUNITY

## 2018-04-26 RX ORDER — FLUTICASONE PROPIONATE 50 MCG
2 SPRAY, SUSPENSION (ML) NASAL DAILY
Qty: 16 G | Refills: 0 | Status: SHIPPED | OUTPATIENT
Start: 2018-04-26

## 2018-04-26 RX ORDER — ACETAMINOPHEN AND PHENYLEPHRINE HCL 325; 5 MG/1; MG/1
TABLET ORAL
COMMUNITY

## 2018-04-26 RX ORDER — LEVOCETIRIZINE DIHYDROCHLORIDE 5 MG/1
5 TABLET, FILM COATED ORAL NIGHTLY
Qty: 30 TABLET | Refills: 0 | Status: SHIPPED | OUTPATIENT
Start: 2018-04-26 | End: 2019-04-26

## 2018-04-26 NOTE — PROGRESS NOTES
"CC:   Chief Complaint   Patient presents with    Sore Throat    running nose     HPI: This is a new problem.   Kylah Ruiz is a 52 y.o. female with a complaint of URI.  The current episode started in the past 3 days.   The problem has been gradually worsening.   Associated symptoms included nasal congestion, rhinorrhea, sore throat, teeth pain.    Pertinent negatives include fever, chills, dyspnea, wheezing   Treatments tried: none has been used and this has provided no relief.     [unfilled]  Outpatient Medications Prior to Visit   Medication Sig Dispense Refill    fish oil-omega-3 fatty acids 300-1,000 mg capsule Take 2 g by mouth once daily.      prasterone, dhea, (DHEA) 50 mg Tab Take 2 tablets by mouth once daily.      UNABLE TO FIND medication name: phermovex       No facility-administered medications prior to visit.         Physical Exam   /64   Pulse 70   Temp 98.5 °F (36.9 °C) (Tympanic)   Resp 18   Ht 5' 1" (1.549 m)   Wt 64.3 kg (141 lb 10.3 oz)   SpO2 99%   BMI 26.76 kg/m²   Constitutional: The patient appears well-developed and well-nourished.   Head: Normocephalic and atraumatic.   Right Ear: Tympanic membrane and ear canal normal. No drainage, swelling or tenderness. Tympanic membrane is not injected, not erythematous and not bulging.   Left Ear: Ear canal normal. No drainage, swelling or tenderness. Tympanic membrane is not injected, not erythematous and not bulging.   Nose: Mucosal edema and rhinorrhea present. Right maxillary sinus tenderness on palpation  Mouth/Throat: Uvula is midline. Posterior oropharyngeal erythema present. No oropharyngeal exudate.        THE MUCOSA IS BOGGY AND ERYTHEMATOUS.     Eyes: Conjunctivae normal and lids are normal. Pupils are equal, round, and reactive to light. Right eye exhibits no discharge. Left eye exhibits no discharge. Right eye exhibits normal extraocular motion. Left eye exhibits normal extraocular motion.   Neck: Trachea normal and " normal range of motion. Neck supple. No tracheal tenderness present. No mass and no thyromegaly present.   Cardiovascular: Normal rate, regular rhythm, S1 normal, S2 normal and normal heart sounds.  Exam reveals no gallop, no S3, no S4 and no friction rub.    No murmur heard.  Pulmonary/Chest: Effort normal and breath sounds normal. No stridor. Not tachypneic. No respiratory distress. The patient has no wheezes. The patient has no rhonchi. The patient has no rales.   Skin: The patient is not diaphoretic.     Encounter Diagnosis   Name Primary?    Acute non-recurrent maxillary sinusitis Yes       PLAN:    Kylah was seen today for sore throat and running nose.    Diagnoses and all orders for this visit:    Acute non-recurrent maxillary sinusitis  -     amoxicillin-clavulanate 875-125mg (AUGMENTIN) 875-125 mg per tablet; Take 1 tablet by mouth every 12 (twelve) hours.  -     fluticasone (FLONASE) 50 mcg/actuation nasal spray; 2 sprays (100 mcg total) by Each Nare route once daily.  -     levocetirizine (XYZAL) 5 MG tablet; Take 1 tablet (5 mg total) by mouth every evening.  Tobacco abuse.  -smoking cessation       Medications Ordered This Encounter      amoxicillin-clavulanate 875-125mg (AUGMENTIN) 875-125 mg per tablet          Sig: Take 1 tablet by mouth every 12 (twelve) hours.          Dispense:  20 tablet          Refill:  0      fluticasone (FLONASE) 50 mcg/actuation nasal spray          Si sprays (100 mcg total) by Each Nare route once daily.          Dispense:  16 g          Refill:  0      levocetirizine (XYZAL) 5 MG tablet          Sig: Take 1 tablet (5 mg total) by mouth every evening.          Dispense:  30 tablet          Refill:  0  No orders of the defined types were placed in this encounter.    RTC if symptoms are worsening or changing significantly or if not improved by the end of therapy.

## 2018-09-19 ENCOUNTER — PATIENT OUTREACH (OUTPATIENT)
Dept: ADMINISTRATIVE | Facility: HOSPITAL | Age: 53
End: 2018-09-19

## 2018-09-19 NOTE — PROGRESS NOTES
I spoke to pt that stated she hasn't had a need to schedule for an annual but will call us back to schedule. Dr Lemus is still her PCP. Pt has not had a mammogram in 2 years and has never had a colonoscopy. Pt verbalized understanding of needed appt.

## 2018-12-04 ENCOUNTER — PATIENT OUTREACH (OUTPATIENT)
Dept: ADMINISTRATIVE | Facility: HOSPITAL | Age: 53
End: 2018-12-04

## (undated) DEVICE — APPLIER CLIP LIAGCLIP 9.375IN

## (undated) DEVICE — GAUZE SPONGE PEANUT STRL

## (undated) DEVICE — GLOVE SURG BIOGEL LATEX SZ 7.5

## (undated) DEVICE — APPLICATOR CHLORAPREP ORN 26ML

## (undated) DEVICE — SHEET THYROID W/ISO-BAC

## (undated) DEVICE — ELECTRODE BLADE INSULATED 1 IN

## (undated) DEVICE — SEE MEDLINE ITEM 157027

## (undated) DEVICE — SUT SILK 2-0 SH 18IN BLACK

## (undated) DEVICE — SYR 10CC LUER LOCK

## (undated) DEVICE — SUT MONOCRYL PLUS 5-0 PS-2

## (undated) DEVICE — COVER OVERHEAD SURG LT BLUE

## (undated) DEVICE — SEE MEDLINE ITEM 152622

## (undated) DEVICE — SPONGE GAUZE 16PLY 4X4

## (undated) DEVICE — Device

## (undated) DEVICE — HEMOSTAT SURGICEL SNOW ABSORB

## (undated) DEVICE — SUT VICRYL 4-0 27 SH

## (undated) DEVICE — APPLIER LIGACLIP SM 9.38IN

## (undated) DEVICE — SHEARS HARMONIC CRVD 9 CM

## (undated) DEVICE — SUT VICRYL 3-0 27 SH

## (undated) DEVICE — ELECTRODE REM PLYHSV RETURN 9

## (undated) DEVICE — CORD BIPOLAR ELECTROSURGICAL

## (undated) DEVICE — SEE MEDLINE ITEM 157117

## (undated) DEVICE — SUT MCRYL PLUS 4-0 PS2 27IN

## (undated) DEVICE — ADHESIVE DERMABOND ADVANCED

## (undated) DEVICE — MANIFOLD 4 PORT

## (undated) DEVICE — SUT VICRYL+ 2-0 SH 18IN

## (undated) DEVICE — SUT SILK 3-0 STRANDS 30IN

## (undated) DEVICE — NDL SAFETY 25G X 1.5 ECLIPSE

## (undated) DEVICE — PROBE SIMULATOR KRAFF

## (undated) DEVICE — SOL NS 1000CC

## (undated) DEVICE — SEE MEDLINE ITEM 157194

## (undated) DEVICE — DRESSING TRANS 2X2 TEGADERM

## (undated) DEVICE — SUT VICRYL 4-0 18 P-3

## (undated) DEVICE — SEE MEDLINE ITEM 152739